# Patient Record
Sex: MALE | Race: BLACK OR AFRICAN AMERICAN | Employment: FULL TIME | URBAN - METROPOLITAN AREA
[De-identification: names, ages, dates, MRNs, and addresses within clinical notes are randomized per-mention and may not be internally consistent; named-entity substitution may affect disease eponyms.]

---

## 2017-03-09 PROBLEM — E66.01 MORBID OBESITY (HCC): Status: ACTIVE | Noted: 2017-03-09

## 2017-03-09 PROBLEM — R29.818 SUSPECTED SLEEP APNEA: Status: ACTIVE | Noted: 2017-03-09

## 2017-03-09 PROBLEM — R63.5 WEIGHT GAIN: Status: ACTIVE | Noted: 2017-03-09

## 2017-03-10 ENCOUNTER — HOSPITAL ENCOUNTER (OUTPATIENT)
Dept: SLEEP MEDICINE | Age: 37
Discharge: HOME | End: 2017-03-10
Payer: COMMERCIAL

## 2017-03-10 PROCEDURE — 95811 POLYSOM 6/>YRS CPAP 4/> PARM: CPT

## 2017-07-20 PROBLEM — G47.26 SHIFT WORK SLEEP DISORDER: Status: ACTIVE | Noted: 2017-07-20

## 2017-07-20 PROBLEM — G47.33 OSA ON CPAP: Status: ACTIVE | Noted: 2017-03-09

## 2017-07-20 PROBLEM — Z99.89 OSA ON CPAP: Status: ACTIVE | Noted: 2017-03-09

## 2021-06-17 PROBLEM — I10 ESSENTIAL HYPERTENSION: Status: ACTIVE | Noted: 2021-06-17

## 2022-03-18 PROBLEM — E66.01 MORBID OBESITY (HCC): Status: ACTIVE | Noted: 2017-03-09

## 2022-03-19 PROBLEM — Z99.89 OSA ON CPAP: Status: ACTIVE | Noted: 2017-03-09

## 2022-03-19 PROBLEM — G47.33 OSA ON CPAP: Status: ACTIVE | Noted: 2017-03-09

## 2022-03-19 PROBLEM — G47.26 SHIFT WORK SLEEP DISORDER: Status: ACTIVE | Noted: 2017-07-20

## 2022-03-19 PROBLEM — R63.5 WEIGHT GAIN: Status: ACTIVE | Noted: 2017-03-09

## 2022-03-20 PROBLEM — I10 ESSENTIAL HYPERTENSION: Status: ACTIVE | Noted: 2021-06-17

## 2022-05-13 DIAGNOSIS — M1A.9XX0 CHRONIC GOUT WITHOUT TOPHUS, UNSPECIFIED CAUSE, UNSPECIFIED SITE: ICD-10-CM

## 2022-05-13 DIAGNOSIS — I10 ESSENTIAL HYPERTENSION: Primary | ICD-10-CM

## 2022-07-12 NOTE — TELEPHONE ENCOUNTER
Patient is calling back concerning getting refills on:    Armodafinil (NUVIGIL) 150 MG TABS tablet     Patient is completely out now. He has appointment on 8/18 with Dr. Dg Pérez.   We cancelled his 6/10 appointment with [facility]    [facility] [place] [facility]

## 2022-07-14 NOTE — PROGRESS NOTES
[address] [place] [address] Court, [address] [facility]  [phone]    Patient Name:  Prince Koroma  YOB: 1980    Pursuant to the emergency declaration under the [facility], [facility] waiver authority and the [employer] Act, this Virtual  Visit was conducted, with patient's consent, to reduce the patient's risk of exposure to COVID-19 and provide continuity of care for an established patient. Telehealth encounter is a billable service, with coverage as determined by the insurance carrier. Services were provided through a video synchronous discussion virtually to substitute for in-person clinic visit. Prince Koroma is a 36 y.o. male who was seen by synchronous (real-time) audio-video technology on 7/18/2022. Consent:  He and/or his healthcare decision maker is aware that this patient-initiated Telehealth encounter is a billable service, with coverage as determined by his insurance carrier. He is aware that he may receive a bill and has provided verbal consent to proceed: Yes        Office Visit 7/18/2022    CHIEF COMPLAINT:    Chief Complaint   Patient presents with    Medication Refill       HISTORY OF PRESENT ILLNESS:  Patient is being seen today via virtual visit. PSG 3/10/17 with AHI 21.8/hr with desaturations to 76%. He is prescribed CPAP 14 cm using a nasal mask. Download reveals excellent compliance on pap therapy with average nightly use 6 hrs. AHI is 1.4/hr on therapy. He denies any problems with the mask or pressure. He would like a new machine and this will be ordered. His current machine is on longer sending data electronically. He does ask about other options to CPAP such as the Inpsire. His current BMI is 40.9 with a weight of 285, 5 ft 10 in. He will work on weight loss. He continues to take Nuvigil 150 mg as needed.  He switches from days to nights depending on his work schedule. He doesn't take Nuvigil when he works straight day shift. When he takes Nuvigil, he feels energetic and less sleepy. He denies any heart palpitations or headahces. He last filled his prescription in April. He needs an updated prescription. Past Medical History:   Diagnosis Date    Essential hypertension 3/65/5701    Folliculitis APPROX 9575    SCALP,    Gout 2002    3-4X/YEAR SINCE '09, LEFT GREAT TOE    ELEAZAR on CPAP        Patient Active Problem List   Diagnosis    Hiccups    Morbid obesity ([address]    Shift work sleep disorder    Folliculitis    Weight gain    Gout    ELEAZAR on CPAP    Essential hypertension           Past Surgical History:   Procedure Laterality Date    VASECTOMY             Social History     Socioeconomic History    Marital status:      Spouse name: Not on file    Number of children: Not on file    Years of education: Not on file    Highest education level: Not on file   Occupational History    Not on file   Tobacco Use    Smoking status: Never    Smokeless tobacco: Never   Substance and Sexual Activity    Alcohol use: Yes    Drug use: No    Sexual activity: Not on file   Other Topics Concern    Not on file   Social History Narrative    10/4/12:  PATIENT IS . OCCUPATION:   (DRIVES [email or web address]). HAS FOUR SONS, AGES 12, 10, 4, 2. TWO YEAR OLD SON IS EXTREMELY DEVELOPMENTALLY DELAYED DUE TO MENINGITIS AT BIRTH.         Social Determinants of Health     Financial Resource Strain: Not on file   Food Insecurity: Not on file   Transportation Needs: Not on file   Physical Activity: Not on file   Stress: Not on file   Social Connections: Not on file   Intimate Partner Violence: Not on file   Housing Stability: Not on file         Family History   Problem Relation Age of Onset    Cancer Neg Hx         no prostate cancer    Hypertension Mother     Hypertension Father     Diabetes Sister          Allergies   Allergen Reactions    Prednisone Other (See Comments)     hiccups         Current Outpatient Medications   Medication Sig    Armodafinil (NUVIGIL) 150 MG TABS tablet Take 1 tablet by mouth in the morning for 180 days. allopurinol (ZYLOPRIM) 300 MG tablet TAKE 1 TABLET DAILY AS DIRECTED    amLODIPine (NORVASC) 5 MG tablet TAKE 1 TABLET DAILY    sildenafil (VIAGRA) 100 MG tablet TAKE 1 TABLET BY MOUTH AS NEEDED. TAKE 1/2 TO 1 FULL TABLET 45 MIN PRIOR TO INTERCOURSE. No current facility-administered medications for this visit. REVIEW OF SYSTEMS:   CONSTITUTIONAL:   There is no history of fever, chills, night sweats. CARDIAC:   No chest pain, pressure, discomfort, palpitations, orthopnea, murmurs, or edema. GI:   No dysphagia, heartburn reflux, nausea/vomiting, diarrhea, abdominal pain, or bleeding. NEURO:   There is no history of AMS, persistent headache, decreased level of consciousness, seizures, or motor or sensory deficits. VIRTUAL EXAM  PHYSICAL EXAMINATION:  [ INSTRUCTIONS:  \"[x]\" Indicates a positive item  \"[]\" Indicates a negative item   Vital Signs: (As obtained by patient/caregiver at home)  There were no vitals taken for this visit.      Constitutional: [x] Appears well-developed and well-nourished [x] No apparent distress      [] Abnormal     Mental status: [x] Alert and awake  [x] Oriented to person/place/time [x] Able to follow commands    [] Abnormal -     Eyes:   EOM    [x]  Normal    [] Abnormal -   Sclera  [x]  Normal    [] Abnormal -          Discharge [x]  None visible   [] Abnormal -    HENT: [x] Normocephalic, atraumatic  [] Abnormal -  [x] Mouth/Throat: Mucous membranes are moist    External Ears [x] Normal  [] Abnormal -    Neck: [x] No visualized mass [] Abnormal -     Pulmonary/Chest: [x] Respiratory effort normal   [x] No visualized signs of difficulty breathing or respiratory distress        [] Abnormal -      Musculoskeletal:   [x] Normal gait with no signs of ataxia         [x] Normal range of motion of neck        [] Abnormal -     Neurological:        [x] No Facial Asymmetry (Cranial nerve 7 motor function) (limited exam due to video visit)          [x] No gaze palsy        [] Abnormal -         Skin:        [x] No significant exanthematous lesions or discoloration noted on facial skin         [] Abnormal -            Psychiatric:       [x] Normal Affect [] Abnormal -       [x] No Hallucinations    Other pertinent observable physical exam findings:-      ASSESSMENT:  (Medical Decision Making)      Diagnosis Orders   1. ELEAZAR on CPAP  DME - DURABLE MEDICAL EQUIPMENT   Continue PAP 14 cm with nightly compliance. Renew supplies and replacement machine  Armodafinil (NUVIGIL) 150 MG TABS tablet      2. Shift work sleep disorder  Armodafinil (NUVIGIL) 150 MG TABS tablet   He Is taking nuvigil when he works night shift. Reports this continues to work well    3. Morbid obesity ([address]  DME - DURABLE MEDICAL EQUIPMENT   Unchanged         PLAN:  Continue CPAP 14 cm with nightly compliance  Replacement machine and renew supplies will be ordered  Refill Nuvigil   Follow up in 6 months or soner if needed    I have reviewed the patients controlled substance prescription history, as maintained in the [place] prescription monitoring program and found no evidence for abuse of Nuvigil.           Orders Placed This Encounter   Procedures    DME - [address]   Replacement S11 resmed    [facility] [place] [address] [place]  Phone: [facility] [address] [facility] [address] [address]  Dept: [phone]      Patient Name: Yuniel Jones  : 1980  Gender: male  Address: [address] [place] [address] Road [facility]  Patient phone number: [phone] (home)       Primary Insurance: Payor: Bren Portillo / Plan: Elyssa Wu [place] / Product Type: *No Product type* /   Subscriber ID: [id number] - ([place] [employer])      AMB Supply Order  Order Details     DME Location:    Order Date: 2022   There were no encounter diagnoses. (  X   )New Set-Up     cpap machine   (   x  ) CPAP Unit  (     ) Auto CPAP Unit  (     ) BiLevel Unit  (     ) Auto BiLevel Unit  (     ) ASV   (     ) Bilevel ST    (     ) Oxygen Concentrator         Length of need: 12 months    Pressure: 14  cmH20  EPR:      Starting Ramp Pressure:   cm H20  Ramp Time: min      Patient had a diagnostic Apnea Hypopnea Index (AHI) of :      *SUPPLIES* Replace all as needed, or per coverage guidelines     Masks Type:    (     ) -Full Face Mask (1 per 3 mon)  (     ) -Full Mask (1 per month) Interface/Cushion      (  x   ) -Nasal Mask (1 per 3 mon)  (  x   ) - Nasal Mask (1 per month) Interface/Cushion  (    x ) -Pillow (2 per mon)  (  x   ) -Knamosbrk (1 per 6 mon)      _________________________________________________________________          Other Supplies:    (  X   )-Yytmwqte (1 per 6 mon)  ( X    )-Dqapzp Tubing (1 per 3 mon)  (  X   )- Disposable Filter (2 per mon)  (   X  )-Turwvx Humidifier (1 per year)     (  x   )-Pkarhnogq (sometimes used with Full Face Mask) (1 per 6 mos)  (     )-Tubing-without heat (1 per 3 mos)  ( X   )-Non-Disposable Filter (1 per 6 mos)  (   x  )-Water Chamber (1 per 6 mos)  (     )-Humidifier non-heated (1 per 5 yrs)      Signed Date: 7/18/2022  Electronically Signed By: SARA Major - CNP     Orders Placed This Encounter   Medications    Armodafinil (NUVIGIL) 150 MG TABS tablet     Sig: Take 1 tablet by mouth in the morning for 180 days. Dispense:  30 tablet     Refill:  5          Collaborating Physician: Dr. Natalie Haywood      I spent at least 25 minutes with this established patient, and >50% of the time was spent counseling and/or coordinating care regarding lorenzo, shift work disorder.     SARA Major - CNP  Electronically signed

## 2022-07-18 ENCOUNTER — TELEMEDICINE (OUTPATIENT)
Dept: SLEEP MEDICINE | Age: 42
End: 2022-07-18
Payer: COMMERCIAL

## 2022-07-18 DIAGNOSIS — Z99.89 OSA ON CPAP: Primary | ICD-10-CM

## 2022-07-18 DIAGNOSIS — G47.33 OSA ON CPAP: Primary | ICD-10-CM

## 2022-07-18 DIAGNOSIS — E66.01 MORBID OBESITY (HCC): ICD-10-CM

## 2022-07-18 DIAGNOSIS — G47.26 SHIFT WORK SLEEP DISORDER: ICD-10-CM

## 2022-07-18 PROCEDURE — 99214 OFFICE O/P EST MOD 30 MIN: CPT | Performed by: NURSE PRACTITIONER

## 2022-07-18 RX ORDER — ARMODAFINIL 150 MG/1
150 TABLET ORAL DAILY
Qty: 30 TABLET | Refills: 5 | Status: SHIPPED | OUTPATIENT
Start: 2022-07-18 | End: 2023-01-14

## 2022-07-18 ASSESSMENT — SLEEP AND FATIGUE QUESTIONNAIRES
ESS TOTAL SCORE: 0
HOW LIKELY ARE YOU TO NOD OFF OR FALL ASLEEP IN A CAR, WHILE STOPPED FOR A FEW MINUTES IN TRAFFIC: 0
HOW LIKELY ARE YOU TO NOD OFF OR FALL ASLEEP WHILE SITTING QUIETLY AFTER LUNCH WITHOUT ALCOHOL: 0
HOW LIKELY ARE YOU TO NOD OFF OR FALL ASLEEP WHILE LYING DOWN TO REST IN THE AFTERNOON WHEN CIRCUMSTANCES PERMIT: 0
HOW LIKELY ARE YOU TO NOD OFF OR FALL ASLEEP WHILE WATCHING TV: 0
HOW LIKELY ARE YOU TO NOD OFF OR FALL ASLEEP WHILE SITTING AND READING: 0
HOW LIKELY ARE YOU TO NOD OFF OR FALL ASLEEP WHEN YOU ARE A PASSENGER IN A CAR FOR AN HOUR WITHOUT A BREAK: 0
HOW LIKELY ARE YOU TO NOD OFF OR FALL ASLEEP WHILE SITTING AND TALKING TO SOMEONE: 0
HOW LIKELY ARE YOU TO NOD OFF OR FALL ASLEEP WHILE SITTING INACTIVE IN A PUBLIC PLACE: 0

## 2022-11-17 ENCOUNTER — TELEMEDICINE (OUTPATIENT)
Dept: FAMILY MEDICINE CLINIC | Facility: CLINIC | Age: 42
End: 2022-11-17
Payer: COMMERCIAL

## 2022-11-17 DIAGNOSIS — R06.6 HICCUPS: Primary | ICD-10-CM

## 2022-11-17 PROBLEM — K57.32 DIVERTICULITIS OF LARGE INTESTINE: Status: ACTIVE | Noted: 2019-01-06

## 2022-11-17 PROCEDURE — 99213 OFFICE O/P EST LOW 20 MIN: CPT | Performed by: NURSE PRACTITIONER

## 2022-11-17 RX ORDER — BACLOFEN 5 MG/1
5 TABLET ORAL 3 TIMES DAILY PRN
Qty: 15 TABLET | Refills: 0 | Status: SHIPPED | OUTPATIENT
Start: 2022-11-17 | End: 2022-11-22

## 2022-11-17 ASSESSMENT — ENCOUNTER SYMPTOMS
RESPIRATORY NEGATIVE: 1
WHEEZING: 0
FACIAL SWELLING: 0
RECTAL PAIN: 0
TROUBLE SWALLOWING: 0
COLOR CHANGE: 0
BLOOD IN STOOL: 0
CHOKING: 0
SHORTNESS OF BREATH: 0
SINUS PRESSURE: 0
COUGH: 0
CHEST TIGHTNESS: 0
ALLERGIC/IMMUNOLOGIC NEGATIVE: 1
RHINORRHEA: 0
APNEA: 0
VOMITING: 0
VOICE CHANGE: 0
ANAL BLEEDING: 0
EYE DISCHARGE: 0
NAUSEA: 0
SORE THROAT: 0
DIARRHEA: 0
ABDOMINAL DISTENTION: 0
BACK PAIN: 0
STRIDOR: 0
ABDOMINAL PAIN: 0
EYES NEGATIVE: 1
EYE PAIN: 0
SINUS PAIN: 0
CONSTIPATION: 0

## 2022-11-17 NOTE — PROGRESS NOTES
[address]  Phone: [phone] Fax [phone]  Ifeanyi Burk. Tristan MS, APRN, FNP-C  11/17/2022    Pushpa Feliciano is a 36 y.o. male who was seen by synchronous (real-time) audio-video technology on 11/17/2022 for Cough (Pt was seen at [facility] for bronchitis last week. Was treated with Doxy and Prednisone. Pt reports taking/finishing Doxy given to him at  last week and reports cough and all previous bronchitis symptoms have resolved. ) and Hiccups (Pt reports having belching and episodic hiccups secondary to the Prednisone he was given at  last week for bronchitis. Pt reports stopping the Prednisone after a few days, but hiccups continued off and on. Pt denies any heartburn/reflux, N/V/D/abd pain associated. Pt reports going to  yesterday, 11/16/22 and was given Omeprazole 20 mg po qhs for 30 days and PRN Magic Mouthwash. Pt reports taking as directed but hiccups have continued off and on. ///)        Assessment & Plan:     1. Hiccups  Discussed with pt. Will have pt continue the Omeprazole 20 mg po qhs for 30 days and PRN Magic Mouthwash. Will also give pt PRN low dose Baclofen as directed for next 5 days to help break hiccup cycle. Pt encouraged to avoid oral Prednisone in the future. Adverse reaction listed in chart. Pt can f/u PRN. Agrees to call in a few days if no better or sooner for concerns/new or worsening symptoms. Pt agrees to go to ER for severe symptoms as discussed. - Baclofen (LIORESAL) 5 MG tablet; Take 1 tablet by mouth 3 times daily as needed (hiccups-watch for sedation)  Dispense: 15 tablet; Refill: 0       Return if symptoms worsen or fail to improve, for Call in a few days if no better or sooner if worse. ER for severe symptoms. Subjective:       Prior to Admission medications    Medication Sig Start Date End Date Taking?  Authorizing Provider   Baclofen (LIORESAL) 5 MG tablet Take 1 tablet by mouth 3 times daily as needed (hiccups-watch for sedation) 11/17/22 11/22/22 Yes Oren Dumont APRN - NP   Armodafinil (NUVIGIL) 150 MG TABS tablet Take 1 tablet by mouth in the morning for 180 days. 7/18/22 1/14/23 Yes SARA Vidales - CNP   allopurinol (ZYLOPRIM) 300 MG tablet TAKE 1 TABLET DAILY AS DIRECTED 3/9/22  Yes Ar Automatic Reconciliation   amLODIPine (NORVASC) 5 MG tablet TAKE 1 TABLET DAILY 3/9/22  Yes Ar Automatic Reconciliation   sildenafil (VIAGRA) 100 MG tablet TAKE 1 TABLET BY MOUTH AS NEEDED. TAKE 1/2 TO 1 FULL TABLET 45 MIN PRIOR TO INTERCOURSE. 7/15/21  Yes Ar Automatic Reconciliation       Allergies   Allergen Reactions    Prednisone Other (See Comments)     hiccups  Patient states that he gets the hiccups. hiccups     ,   Past Medical History:   Diagnosis Date    Essential hypertension 2/72/1692    Folliculitis APPROX 7040    SCALP,    Gout 2002    3-4X/YEAR SINCE '09, LEFT GREAT TOE    ELEAZAR on CPAP     Patellar tendinitis of left knee 4/6/2016   ,   Past Surgical History:   Procedure Laterality Date    VASECTOMY     ,   Social History     Tobacco Use    Smoking status: Never    Smokeless tobacco: Never   Substance Use Topics    Alcohol use: Yes    Drug use: No   ,   Family History   Problem Relation Age of Onset    Cancer Neg Hx         no prostate cancer    Hypertension Mother     Hypertension Father     Diabetes Sister          Review of Systems   Constitutional: Negative. Negative for appetite change, chills, diaphoresis, fatigue, fever and unexpected weight change. HENT: Negative. Negative for congestion, dental problem, drooling, ear discharge, ear pain, facial swelling, hearing loss, mouth sores, nosebleeds, postnasal drip, rhinorrhea, sinus pressure, sinus pain, sneezing, sore throat, tinnitus, trouble swallowing and voice change. Eyes: Negative. Negative for pain, discharge and visual disturbance. Respiratory: Negative.   Negative for apnea, cough, choking, chest tightness, shortness of breath, wheezing and stridor. Pt reports taking/finishing Doxy given to him at  last week and reports cough and all previous bronchitis symptoms have resolved. Cardiovascular: Negative. Negative for chest pain, palpitations and leg swelling. Gastrointestinal:  Negative for abdominal distention, abdominal pain, anal bleeding, blood in stool, constipation, diarrhea, nausea, rectal pain and vomiting. Pt reports having belching and episodic hiccups secondary to the Prednisone he was given at  last week for bronchitis. Pt reports stopping the Prednisone after a few days, but hiccups continued off and on. Pt denies any heartburn/reflux, N/V/D/abd pain associated. Pt reports going to  yesterday, 11/16/22 and was given Omeprazole 20 mg po qhs for 30 days and PRN Magic Mouthwash. Pt reports taking as directed but hiccups have continued off and on. Endocrine: Negative. Negative for cold intolerance, heat intolerance, polydipsia, polyphagia and polyuria. Genitourinary: Negative. Negative for decreased urine volume, difficulty urinating, dysuria, flank pain, frequency, genital sores, hematuria, penile discharge, penile pain, penile swelling, scrotal swelling, testicular pain and urgency. Musculoskeletal: Negative. Negative for arthralgias, back pain, gait problem, joint swelling, myalgias, neck pain and neck stiffness. Skin: Negative. Negative for color change, pallor, rash and wound. Allergic/Immunologic: Negative. Negative for environmental allergies. Neurological: Negative. Negative for dizziness, tremors, seizures, syncope, facial asymmetry, speech difficulty, weakness, light-headedness, numbness and headaches. Hematological: Negative. Negative for adenopathy. Does not bruise/bleed easily. Physical Exam  Constitutional:       General: He is not in acute distress. Appearance: Normal appearance. He is not ill-appearing, toxic-appearing or diaphoretic.    HENT:      Head: Normocephalic and atraumatic. Pulmonary:      Effort: Pulmonary effort is normal. No respiratory distress. Comments: No audible wheezing heard. Pt has hiccups at present but is having no trouble breathing  Neurological:      Mental Status: He is alert and oriented to person, place, and time. Psychiatric:         Mood and Affect: Mood normal.         Behavior: Behavior normal.         Thought Content: Thought content normal.         Judgment: Judgment normal.           Braxton Chowdhury (: 1980) is a 36 y.o. male, Established patient patient, seen via A/V VV for evaluation of the following chief complaint(s):  Cough (Pt was seen at [facility] for bronchitis last week. Was treated with Doxy and Prednisone. Pt reports taking/finishing Doxy given to him at  last week and reports cough and all previous bronchitis symptoms have resolved. ) and Hiccups (Pt reports having belching and episodic hiccups secondary to the Prednisone he was given at  last week for bronchitis. Pt reports stopping the Prednisone after a few days, but hiccups continued off and on. Pt denies any heartburn/reflux, N/V/D/abd pain associated. Pt reports going to  yesterday, 22 and was given Omeprazole 20 mg po qhs for 30 days and PRN Magic Mouthwash. Pt reports taking as directed but hiccups have continued off and on. ///)    We discussed the expected course, resolution and complications of the diagnosis(es) in detail. Medication risks, benefits, costs, interactions, and alternatives were discussed as indicated. I advised him to contact the office if his condition worsens, changes or fails to improve as anticipated. He expressed understanding with the diagnosis(es) and plan. Braxton Chowdhury, was evaluated through a synchronous (real-time) audio-video encounter. The patient (or guardian if applicable) is aware that this is a billable service. Verbal consent to proceed has been obtained within the past 12 months.  The visit was conducted pursuant to the emergency declaration under the [address] [place] [address], [address] [place] [address] and the [employer] and [employer] Act. Patient identification was verified, and a caregiver was present when appropriate. The patient was located in a state where the provider was credentialed to provide care. Dennys Guadarrama, was evaluated through a synchronous (real-time) audio-video encounter. The patient (or guardian if applicable) is aware that this is a billable service, which includes applicable co-pays. This Virtual Visit was conducted with patient's (and/or legal guardian's) consent. The visit was conducted pursuant to the emergency declaration under the [facility], [address] [place] [address] and the [employer] and [employer] Act. Patient identification was verified, and a caregiver was present when appropriate. The patient was located at Home: [place] [address] [facility]. Provider was located at [facility] ([address] [place], [place] [address]t): [address] Nitin Glaser [address],  [address]      SARA Salazar NP

## 2023-01-05 ENCOUNTER — OFFICE VISIT (OUTPATIENT)
Dept: FAMILY MEDICINE CLINIC | Facility: CLINIC | Age: 43
End: 2023-01-05
Payer: COMMERCIAL

## 2023-01-05 VITALS
SYSTOLIC BLOOD PRESSURE: 124 MMHG | DIASTOLIC BLOOD PRESSURE: 82 MMHG | BODY MASS INDEX: 43.18 KG/M2 | WEIGHT: 301.6 LBS | HEIGHT: 70 IN

## 2023-01-05 DIAGNOSIS — E66.01 OBESITY, CLASS III, BMI 40-49.9 (MORBID OBESITY) (HCC): ICD-10-CM

## 2023-01-05 DIAGNOSIS — M1A.9XX0 CHRONIC GOUT WITHOUT TOPHUS, UNSPECIFIED CAUSE, UNSPECIFIED SITE: ICD-10-CM

## 2023-01-05 DIAGNOSIS — Z00.00 ROUTINE GENERAL MEDICAL EXAMINATION AT A HEALTH CARE FACILITY: Primary | ICD-10-CM

## 2023-01-05 DIAGNOSIS — I10 PRIMARY HYPERTENSION: ICD-10-CM

## 2023-01-05 DIAGNOSIS — G47.33 OSA (OBSTRUCTIVE SLEEP APNEA): ICD-10-CM

## 2023-01-05 PROCEDURE — 90471 IMMUNIZATION ADMIN: CPT | Performed by: FAMILY MEDICINE

## 2023-01-05 PROCEDURE — 3078F DIAST BP <80 MM HG: CPT | Performed by: FAMILY MEDICINE

## 2023-01-05 PROCEDURE — 90674 CCIIV4 VAC NO PRSV 0.5 ML IM: CPT | Performed by: FAMILY MEDICINE

## 2023-01-05 PROCEDURE — 99396 PREV VISIT EST AGE 40-64: CPT | Performed by: FAMILY MEDICINE

## 2023-01-05 PROCEDURE — 3074F SYST BP LT 130 MM HG: CPT | Performed by: FAMILY MEDICINE

## 2023-01-05 RX ORDER — AMLODIPINE BESYLATE 5 MG/1
TABLET ORAL
Qty: 90 TABLET | Refills: 3 | Status: SHIPPED | OUTPATIENT
Start: 2023-01-05

## 2023-01-05 RX ORDER — ALLOPURINOL 300 MG/1
TABLET ORAL
Qty: 90 TABLET | Refills: 3 | Status: SHIPPED | OUTPATIENT
Start: 2023-01-05

## 2023-01-05 ASSESSMENT — PATIENT HEALTH QUESTIONNAIRE - PHQ9
SUM OF ALL RESPONSES TO PHQ9 QUESTIONS 1 & 2: 0
1. LITTLE INTEREST OR PLEASURE IN DOING THINGS: 0
SUM OF ALL RESPONSES TO PHQ QUESTIONS 1-9: 0
2. FEELING DOWN, DEPRESSED OR HOPELESS: 0
SUM OF ALL RESPONSES TO PHQ QUESTIONS 1-9: 0

## 2023-01-05 NOTE — PROGRESS NOTES
SUBJECTIVE:   Jacquenette Crigler is a 36 y.o. male who has a past medical history significant for hicups, hypertension, gout, renal insufficiency, sleep apnea, and morbid obesity. Presents today for a CPX. Review of systems reveals no complaints of chest pain, shortness of breath, orthopnea or PND. GI and  review of systems is unremarkable. Patient reports no issues with his gout since the allopurinol was increased to 300 mg. HPI  See above    Past Medical History, Past Surgical History, Family history, Social History, and Medications were all reviewed with the patient today and updated as necessary. Current Outpatient Medications   Medication Sig Dispense Refill    Armodafinil (NUVIGIL) 150 MG TABS tablet Take 1 tablet by mouth in the morning for 180 days. 30 tablet 5    allopurinol (ZYLOPRIM) 300 MG tablet TAKE 1 TABLET DAILY AS DIRECTED      amLODIPine (NORVASC) 5 MG tablet TAKE 1 TABLET DAILY      sildenafil (VIAGRA) 100 MG tablet TAKE 1 TABLET BY MOUTH AS NEEDED. TAKE 1/2 TO 1 FULL TABLET 45 MIN PRIOR TO INTERCOURSE. No current facility-administered medications for this visit. Allergies   Allergen Reactions    Prednisone Other (See Comments)     hiccups  Patient states that he gets the hiccups.   hiccups       Patient Active Problem List   Diagnosis    Hiccups    Morbid obesity ([facility] [address]    Shift work sleep disorder    Folliculitis    Weight gain    Gout    ELEAZAR on CPAP    Essential hypertension    Diverticulitis of large intestine    Patellar tendinitis of left knee     Past Medical History:   Diagnosis Date    Essential hypertension 4/02/8497    Folliculitis APPROX 9684    SCALP,    Gout 2002    3-4X/YEAR SINCE '09, LEFT GREAT TOE    ELEAZAR on CPAP     Patellar tendinitis of left knee 4/6/2016     Past Surgical History:   Procedure Laterality Date    VASECTOMY       Family History   Problem Relation Age of Onset    Cancer Neg Hx         no prostate cancer    Hypertension Mother     Hypertension Father     Diabetes Sister      Social History     Tobacco Use    Smoking status: Never    Smokeless tobacco: Never   Substance Use Topics    Alcohol use: Yes         Review of Systems  See above    OBJECTIVE:  /82   Ht 5' 10\" (1.778 m)   Wt (!) 301 lb 9.6 oz (136.8 kg)   BMI 43.28 kg/m²      Physical Exam  Vitals reviewed. Constitutional:       General: He is not in acute distress. Appearance: Normal appearance. He is obese. HENT:      Head: Normocephalic and atraumatic. Right Ear: Tympanic membrane, ear canal and external ear normal. There is no impacted cerumen. Left Ear: Tympanic membrane, ear canal and external ear normal. There is no impacted cerumen. Nose: Nose normal.      Mouth/Throat:      Mouth: Mucous membranes are moist.      Pharynx: Oropharynx is clear. No oropharyngeal exudate or posterior oropharyngeal erythema. Eyes:      General: No scleral icterus. Extraocular Movements: Extraocular movements intact. Conjunctiva/sclera: Conjunctivae normal.      Pupils: Pupils are equal, round, and reactive to light. Neck:      Vascular: No carotid bruit. Cardiovascular:      Rate and Rhythm: Normal rate and regular rhythm. Pulses: Normal pulses. Heart sounds: Normal heart sounds. No murmur heard. Pulmonary:      Effort: Pulmonary effort is normal. No respiratory distress. Breath sounds: Normal breath sounds. No wheezing or rhonchi. Abdominal:      General: Abdomen is flat. Bowel sounds are normal. There is no distension. Palpations: Abdomen is soft. There is no mass. Tenderness: There is no abdominal tenderness. There is no guarding or rebound. Hernia: No hernia is present. Musculoskeletal:         General: Normal range of motion. Cervical back: Normal range of motion and neck supple. Right lower leg: No edema. Left lower leg: No edema. Lymphadenopathy:      Cervical: No cervical adenopathy.    Skin:     General: Skin is warm. Findings: No rash. Neurological:      General: No focal deficit present. Mental Status: He is alert and oriented to person, place, and time. Cranial Nerves: No cranial nerve deficit. Motor: No weakness. Deep Tendon Reflexes: Reflexes normal.   Psychiatric:         Mood and Affect: Mood normal.         Behavior: Behavior normal.         Thought Content: Thought content normal.         Judgment: Judgment normal.       Medical problems and test results were reviewed with the patient today. ASSESSMENT and PLAN    1.  CPX. Anticipatory guidance discussed including the importance of sunscreen use, helmet use and seatbelt use. Flu vaccine offered and given today. 2.  Hypertension. /82. Continue Norvasc. Check metabolic panel, TSH, CBC and lipid panel. 3.  Gout. Check uric acid level. Continue allopurinol. No complaints. 4.  Sleep apnea. Continue CPAP. 5.  Morbid obesity. BMI 43.2. Dietary counseling provided with suggestions regarding potential weight loss strategies. Elements of this note have been dictated using speech recognition software. As a result, errors of speech recognition may have occurred.

## 2023-01-06 LAB
ALBUMIN SERPL-MCNC: 3.5 G/DL (ref 3.5–5)
ALBUMIN/GLOB SERPL: 0.9 {RATIO} (ref 0.4–1.6)
ALP SERPL-CCNC: 95 U/L (ref 50–136)
ALT SERPL-CCNC: 26 U/L (ref 12–65)
ANION GAP SERPL CALC-SCNC: 5 MMOL/L (ref 2–11)
AST SERPL-CCNC: 21 U/L (ref 15–37)
BASOPHILS # BLD: 0.1 K/UL (ref 0–0.2)
BASOPHILS NFR BLD: 1 % (ref 0–2)
BILIRUB SERPL-MCNC: 0.4 MG/DL (ref 0.2–1.1)
BUN SERPL-MCNC: 16 MG/DL (ref 6–23)
CALCIUM SERPL-MCNC: 9.3 MG/DL (ref 8.3–10.4)
CHLORIDE SERPL-SCNC: 104 MMOL/L (ref 101–110)
CHOLEST SERPL-MCNC: 181 MG/DL
CO2 SERPL-SCNC: 28 MMOL/L (ref 21–32)
CREAT SERPL-MCNC: 1.4 MG/DL (ref 0.8–1.5)
DIFFERENTIAL METHOD BLD: ABNORMAL
EOSINOPHIL # BLD: 0.3 K/UL (ref 0–0.8)
EOSINOPHIL NFR BLD: 5 % (ref 0.5–7.8)
ERYTHROCYTE [DISTWIDTH] IN BLOOD BY AUTOMATED COUNT: 14.3 % (ref 11.9–14.6)
GLOBULIN SER CALC-MCNC: 3.9 G/DL (ref 2.8–4.5)
GLUCOSE SERPL-MCNC: 98 MG/DL (ref 65–100)
HCT VFR BLD AUTO: 43.3 % (ref 41.1–50.3)
HDLC SERPL-MCNC: 52 MG/DL (ref 40–60)
HDLC SERPL: 3.5 {RATIO}
HGB BLD-MCNC: 14 G/DL (ref 13.6–17.2)
IMM GRANULOCYTES # BLD AUTO: 0 K/UL (ref 0–0.5)
IMM GRANULOCYTES NFR BLD AUTO: 0 % (ref 0–5)
LDLC SERPL CALC-MCNC: 116.8 MG/DL
LYMPHOCYTES # BLD: 2.4 K/UL (ref 0.5–4.6)
LYMPHOCYTES NFR BLD: 33 % (ref 13–44)
MCH RBC QN AUTO: 30 PG (ref 26.1–32.9)
MCHC RBC AUTO-ENTMCNC: 32.3 G/DL (ref 31.4–35)
MCV RBC AUTO: 92.9 FL (ref 82–102)
MONOCYTES # BLD: 0.7 K/UL (ref 0.1–1.3)
MONOCYTES NFR BLD: 10 % (ref 4–12)
NEUTS SEG # BLD: 3.8 K/UL (ref 1.7–8.2)
NEUTS SEG NFR BLD: 51 % (ref 43–78)
NRBC # BLD: 0 K/UL (ref 0–0.2)
PLATELET # BLD AUTO: 360 K/UL (ref 150–450)
PMV BLD AUTO: 9.3 FL (ref 9.4–12.3)
POTASSIUM SERPL-SCNC: 3.9 MMOL/L (ref 3.5–5.1)
PROT SERPL-MCNC: 7.4 G/DL (ref 6.3–8.2)
RBC # BLD AUTO: 4.66 M/UL (ref 4.23–5.6)
SODIUM SERPL-SCNC: 137 MMOL/L (ref 133–143)
TRIGL SERPL-MCNC: 61 MG/DL (ref 35–150)
TSH, 3RD GENERATION: 2.69 UIU/ML (ref 0.36–3.74)
URATE SERPL-MCNC: 5 MG/DL (ref 2.6–6)
VLDLC SERPL CALC-MCNC: 12.2 MG/DL (ref 6–23)
WBC # BLD AUTO: 7.2 K/UL (ref 4.3–11.1)

## 2023-01-20 NOTE — PROGRESS NOTES
[address] [place] [address] Court, [address] [facility]  [phone]    Patient Name:  Rangel Alegre  YOB: 1980    Pursuant to the emergency declaration under the [facility], [facility] waiver authority and the [employer] Act, this Virtual  Visit was conducted, with patient's consent, to reduce the patient's risk of exposure to COVID-19 and provide continuity of care for an established patient. Telehealth encounter is a billable service, with coverage as determined by the insurance carrier. Services were provided through a video synchronous discussion virtually to substitute for in-person clinic visit. Rangel Alegre is a 36 y.o. male who was seen by synchronous (real-time) audio-video technology on 1/23/2023. Consent:  He and/or his healthcare decision maker is aware that this patient-initiated Telehealth encounter is a billable service, with coverage as determined by his insurance carrier. He is aware that he may receive a bill and has provided verbal consent to proceed: Yes        Office Visit 1/23/2023    CHIEF COMPLAINT:    Chief Complaint   Patient presents with    CPAP/BiPAP    Sleep Apnea    Daytime Sleepiness    Medication Refill       HISTORY OF PRESENT ILLNESS:  Patient is being seen today via virtual visit. PSG 3/10/2017 with AHI 21.8 events per hour with lowest oxygen saturation 76%. He is prescribed CPAP 14 cm using a nasal mask. His download reveals nightly compliance when he sleeps with nightly use six hours. AHI is 1.3 events per hour. He continues to work swing shift with the [employer] and changes his schedules often. He is prescribed Nuvigil 150 mg. He only takes this when he works night shift. When he works straight dayshift, he does not require a stimulant. He continues to feel less sleepy and more energetic when he takes his medication.  Denies any side effects, heart palpitations, chest pain or headaches.    He feels the CPAP mask and pressure or working well. He does continue to notice improvement in sleep quality with taking it.    His last fill of Nuvigil was in October. He will need an updated prescription today.    He reports weight gain since last visit after going on a cruise and eating and drinking more than he should. He is actually in the parking lot of the [employer] this morning starting to exercise again.            Sleep Medicine 1/23/2023 7/18/2022   Sitting and reading 1 0   Watching TV 1 0   Sitting, inactive in a public place (e.g. a theatre or a meeting) 1 0   As a passenger in a car for an hour without a break 1 0   Lying down to rest in the afternoon when circumstances permit 0 0   Sitting and talking to someone 0 0   Sitting quietly after a lunch without alcohol 0 0   In a car, while stopped for a few minutes in traffic 0 0   [place] Sleepiness Score 4 0              Past Medical History:   Diagnosis Date    Essential hypertension 6/17/2021    Folliculitis APPROX 2007    SCALP,    Gout 2002    3-4X/YEAR SINCE '09, LEFT GREAT TOE    ELEAZAR on CPAP     Patellar tendinitis of left knee 4/6/2016       Patient Active Problem List   Diagnosis    Hiccups    Morbid obesity (HCC)    Shift work sleep disorder    Folliculitis    Weight gain    Gout    ELEAZAR on CPAP    Essential hypertension    Diverticulitis of large intestine    Patellar tendinitis of left knee           Past Surgical History:   Procedure Laterality Date    VASECTOMY             Social History     Socioeconomic History    Marital status:      Spouse name: Not on file    Number of children: Not on file    Years of education: Not on file    Highest education level: Not on file   Occupational History    Not on file   Tobacco Use    Smoking status: Never    Smokeless tobacco: Never   Substance and Sexual Activity    Alcohol use: Yes    Drug use: No    Sexual activity: Not on file   Other Topics  Concern    Not on file   Social History Narrative    10/4/12:  PATIENT IS . OCCUPATION:   (DRIVES [employer]).  HAS FOUR SONS, AGES 12, 10, 4, 2.  TWO YEAR OLD SON IS EXTREMELY DEVELOPMENTALLY DELAYED DUE TO MENINGITIS AT BIRTH.        Social Determinants of Health     Financial Resource Strain: Not on file   Food Insecurity: Not on file   Transportation Needs: Not on file   Physical Activity: Not on file   Stress: Not on file   Social Connections: Not on file   Intimate Partner Violence: Not on file   Housing Stability: Not on file         Family History   Problem Relation Age of Onset    Cancer Neg Hx         no prostate cancer    Hypertension Mother     Hypertension Father     Diabetes Sister          Allergies   Allergen Reactions    Prednisone Other (See Comments)     hiccups  Patient states that he gets the hiccups.  hiccups           Current Outpatient Medications   Medication Sig    Armodafinil (NUVIGIL) 150 MG TABS tablet Take 1 tablet by mouth daily for 180 days. Max Daily Amount: 150 mg    allopurinol (ZYLOPRIM) 300 MG tablet TAKE 1 TABLET DAILY AS DIRECTED    amLODIPine (NORVASC) 5 MG tablet TAKE 1 TABLET DAILY    sildenafil (VIAGRA) 100 MG tablet TAKE 1 TABLET BY MOUTH AS NEEDED. TAKE 1/2 TO 1 FULL TABLET 45 MIN PRIOR TO INTERCOURSE.     No current facility-administered medications for this visit.             REVIEW OF SYSTEMS:   CONSTITUTIONAL:   There is no history of fever, chills, night sweats.     CARDIAC:   No chest pain, pressure, discomfort, palpitations, orthopnea, murmurs, or edema.   GI:   No dysphagia, heartburn reflux, nausea/vomiting, diarrhea, abdominal pain, or bleeding.   NEURO:   There is no history of AMS, persistent headache, decreased level of consciousness, seizures, or motor or sensory deficits.    VIRTUAL EXAM  PHYSICAL EXAMINATION:  [ INSTRUCTIONS:  \"[x]\" Indicates a positive item  \"[]\" Indicates a negative item   Vital Signs: (As obtained by patient/caregiver  at home)  There were no vitals taken for this visit. Constitutional: [x] Appears well-developed and well-nourished [x] No apparent distress      [] Abnormal     Mental status: [x] Alert and awake  [x] Oriented to person/place/time [x] Able to follow commands    [] Abnormal -     Eyes:   EOM    [x]  Normal    [] Abnormal -   Sclera  [x]  Normal    [] Abnormal -          Discharge [x]  None visible   [] Abnormal -    HENT: [x] Normocephalic, atraumatic  [] Abnormal -  [x] Mouth/Throat: Mucous membranes are moist    External Ears [x] Normal  [] Abnormal -    Neck: [x] No visualized mass [] Abnormal -     Pulmonary/Chest: [x] Respiratory effort normal   [x] No visualized signs of difficulty breathing or respiratory distress        [] Abnormal -      Musculoskeletal:   [x] Normal gait with no signs of ataxia         [x] Normal range of motion of neck        [] Abnormal -     Neurological:        [x] No Facial Asymmetry (Cranial nerve 7 motor function) (limited exam due to video visit)          [x] No gaze palsy        [] Abnormal -         Skin:        [x] No significant exanthematous lesions or discoloration noted on facial skin         [] Abnormal -            Psychiatric:       [x] Normal Affect [] Abnormal -       [x] No Hallucinations    Other pertinent observable physical exam findings:-      ASSESSMENT:  (Medical Decision Making)      Diagnosis Orders   1. ELEAZAR on CPAP  Armodafinil (NUVIGIL) 150 MG TABS tablet   AHI well controlled on cpap 14 cm. Continue current settings with nightly compliance. Renew supplies   DME - DURABLE MEDICAL EQUIPMENT      2. Shift work sleep disorder  Armodafinil (NUVIGIL) 150 MG TABS tablet   He continues to alternate night shift and day shift depending on his work schedule. Quynh Roche is working well and improving his energy when on night shift. He needs a refill. Scripts site with no evidence of abuse. DME - DURABLE MEDICAL EQUIPMENT      3.  Weight gain  Armodafinil (NUVIGIL) 150 MG TABS tablet   He reports weight gain since last visit but is working on improving his eating habits, less alcohol and exercising again  DME - DURABLE MEDICAL EQUIPMENT           PLAN:  Continue CPAP 14 cm  Renew supplies  Refill Nuvigil    Follow up in 6 months or sooner if needed     Orders Placed This Encounter   Procedures    DME - [facility]  Phone: [address] [address]  Dept: [phone]      Patient Name: Armida Carrillo  : 1980  Gender: male  Address: [address] [place] [address]   Patient phone: [phone] (home)       Primary Insurance: Payor: [employer] / Plan: Peter Yang / Product Type: *No Product type* /   Subscriber ID: [id number] - (Suzy SCHULTZ)      AMB Supply Order  Order Details     DME Location:x   Order Date: 2023   The primary encounter diagnosis was ELEAZAR on CPAP. Diagnoses of Shift work sleep disorder and Weight gain were also pertinent to this visit.              (  X   )Supplies Needed       Cpap  Machine   (     ) CPAP Unit  (     ) Auto CPAP Unit  (     ) BiLevel Unit  (     ) Auto BiLevel Unit  (     ) ASV        (     ) Bilevel ST      Length of need: 12 months    Pressure:  14 cmH20  EPR:     Starting Ramp Pressure:   cm H20  Ramp Time: min        Patient had a diagnostic Apnea Hypopnea Index (AHI) of :    *SUPPLIES* Replace all as needed, or per coverage guidelines     Masks Type:  (    ) -Full Face Mask (1 per 3 mon)  (    ) -Full Mask (1 per month) Interface/Cushion      ( x ) -Nasal Mask (1 per 3 mon)  ( x  ) - Nasal Mask (1 per month) Interface/Cushion  (  x   ) -Pillow (2 per mon)  (     ) -Socrdjnym (1 per 6 mon)            Other Supplies:    (   X  )-Eufgpvsr (1 per 6 mon)  (   X  )-Eyjmkl Tubing (1 per 3 mon)  (   X  )- Disposable Filter (2 per mon)  (   x  )-Awuwog Humidifier (1 per year)     (  x   )-Hsegdcppa (sometimes used with Full Face Mask) (1 per 6 mos)  (    )-Tubing-without heat (1 per 3 mos)  (     )-Non-Disposable Filter (1 per 6 mos)  (  x   )-Water Chamber (1 per 6 mos)  (     )-Humidifier non-heated (1 per 5 yrs)      Signed Date: 1/23/2023  Electronically Signed By: SARA Gold CNP  Electronically Dated:  1/23/2023       Orders Placed This Encounter   Medications    Armodafinil (NUVIGIL) 150 MG TABS tablet     Sig: Take 1 tablet by mouth daily for 180 days. Max Daily Amount: 150 mg     Dispense:  30 tablet     Refill:  5            Collaborating Physician: Dr. Dontrell Powell      I spent at least 20 minutes with this established patient, and >50% of the time was spent counseling and/or coordinating care regarding lorenzo, hypersomnia.     SARA Gold CNP  Electronically signed

## 2023-01-23 ENCOUNTER — TELEMEDICINE (OUTPATIENT)
Dept: SLEEP MEDICINE | Age: 43
End: 2023-01-23
Payer: COMMERCIAL

## 2023-01-23 DIAGNOSIS — R63.5 WEIGHT GAIN: ICD-10-CM

## 2023-01-23 DIAGNOSIS — G47.33 OSA ON CPAP: Primary | ICD-10-CM

## 2023-01-23 DIAGNOSIS — Z99.89 OSA ON CPAP: Primary | ICD-10-CM

## 2023-01-23 DIAGNOSIS — G47.26 SHIFT WORK SLEEP DISORDER: ICD-10-CM

## 2023-01-23 PROCEDURE — 99214 OFFICE O/P EST MOD 30 MIN: CPT | Performed by: NURSE PRACTITIONER

## 2023-01-23 RX ORDER — ARMODAFINIL 150 MG/1
150 TABLET ORAL DAILY
COMMUNITY
End: 2023-01-23 | Stop reason: SDUPTHER

## 2023-01-23 RX ORDER — ARMODAFINIL 150 MG/1
150 TABLET ORAL DAILY
Qty: 30 TABLET | Refills: 5 | Status: SHIPPED | OUTPATIENT
Start: 2023-01-23 | End: 2023-07-22

## 2023-01-23 ASSESSMENT — SLEEP AND FATIGUE QUESTIONNAIRES
HOW LIKELY ARE YOU TO NOD OFF OR FALL ASLEEP WHEN YOU ARE A PASSENGER IN A CAR FOR AN HOUR WITHOUT A BREAK: 1
HOW LIKELY ARE YOU TO NOD OFF OR FALL ASLEEP IN A CAR, WHILE STOPPED FOR A FEW MINUTES IN TRAFFIC: 0
HOW LIKELY ARE YOU TO NOD OFF OR FALL ASLEEP WHILE SITTING INACTIVE IN A PUBLIC PLACE: 1
ESS TOTAL SCORE: 4
HOW LIKELY ARE YOU TO NOD OFF OR FALL ASLEEP WHILE SITTING QUIETLY AFTER LUNCH WITHOUT ALCOHOL: 0
HOW LIKELY ARE YOU TO NOD OFF OR FALL ASLEEP WHILE SITTING AND READING: 1
HOW LIKELY ARE YOU TO NOD OFF OR FALL ASLEEP WHILE WATCHING TV: 1
HOW LIKELY ARE YOU TO NOD OFF OR FALL ASLEEP WHILE SITTING AND TALKING TO SOMEONE: 0
HOW LIKELY ARE YOU TO NOD OFF OR FALL ASLEEP WHILE LYING DOWN TO REST IN THE AFTERNOON WHEN CIRCUMSTANCES PERMIT: 0

## 2023-02-15 ENCOUNTER — TELEMEDICINE (OUTPATIENT)
Dept: FAMILY MEDICINE CLINIC | Facility: CLINIC | Age: 43
End: 2023-02-15
Payer: COMMERCIAL

## 2023-02-15 DIAGNOSIS — I10 PRIMARY HYPERTENSION: ICD-10-CM

## 2023-02-15 DIAGNOSIS — M1A.9XX0 CHRONIC GOUT WITHOUT TOPHUS, UNSPECIFIED CAUSE, UNSPECIFIED SITE: Primary | ICD-10-CM

## 2023-02-15 DIAGNOSIS — G47.33 OSA (OBSTRUCTIVE SLEEP APNEA): ICD-10-CM

## 2023-02-15 PROCEDURE — 99442 PR PHYS/QHP TELEPHONE EVALUATION 11-20 MIN: CPT | Performed by: FAMILY MEDICINE

## 2023-02-15 SDOH — ECONOMIC STABILITY: TRANSPORTATION INSECURITY
IN THE PAST 12 MONTHS, HAS LACK OF TRANSPORTATION KEPT YOU FROM MEETINGS, WORK, OR FROM GETTING THINGS NEEDED FOR DAILY LIVING?: NO

## 2023-02-15 SDOH — ECONOMIC STABILITY: INCOME INSECURITY: HOW HARD IS IT FOR YOU TO PAY FOR THE VERY BASICS LIKE FOOD, HOUSING, MEDICAL CARE, AND HEATING?: NOT HARD AT ALL

## 2023-02-15 SDOH — ECONOMIC STABILITY: FOOD INSECURITY: WITHIN THE PAST 12 MONTHS, YOU WORRIED THAT YOUR FOOD WOULD RUN OUT BEFORE YOU GOT MONEY TO BUY MORE.: NEVER TRUE

## 2023-02-15 SDOH — ECONOMIC STABILITY: FOOD INSECURITY: WITHIN THE PAST 12 MONTHS, THE FOOD YOU BOUGHT JUST DIDN'T LAST AND YOU DIDN'T HAVE MONEY TO GET MORE.: NEVER TRUE

## 2023-02-15 SDOH — ECONOMIC STABILITY: HOUSING INSECURITY
IN THE LAST 12 MONTHS, WAS THERE A TIME WHEN YOU DID NOT HAVE A STEADY PLACE TO SLEEP OR SLEPT IN A SHELTER (INCLUDING NOW)?: NO

## 2023-02-15 ASSESSMENT — PATIENT HEALTH QUESTIONNAIRE - PHQ9
SUM OF ALL RESPONSES TO PHQ QUESTIONS 1-9: 0
SUM OF ALL RESPONSES TO PHQ QUESTIONS 1-9: 0
SUM OF ALL RESPONSES TO PHQ9 QUESTIONS 1 & 2: 0
2. FEELING DOWN, DEPRESSED OR HOPELESS: 0
SUM OF ALL RESPONSES TO PHQ QUESTIONS 1-9: 0
SUM OF ALL RESPONSES TO PHQ QUESTIONS 1-9: 0
1. LITTLE INTEREST OR PLEASURE IN DOING THINGS: 0

## 2023-02-15 NOTE — PROGRESS NOTES
SUBJECTIVE:   Irving Reed is a 36 y.o. male who has a past medical history significant for hicups, hypertension, gout, renal insufficiency, sleep apnea, and morbid obesity. Patient presents today for virtual visit via telephone encounter. Review of systems reveals no complaints of chest pain, shortness of breath, orthopnea or PND. GI and  review of systems is unremarkable. Current medications are listed in the EMR and reviewed today. Patient's vital signs were not performed as encounter was performed virtually. Location of virtual visit was patient's home. HPI  See above    Past Medical History, Past Surgical History, Family history, Social History, and Medications were all reviewed with the patient today and updated as necessary. Current Outpatient Medications   Medication Sig Dispense Refill    Armodafinil (NUVIGIL) 150 MG TABS tablet Take 1 tablet by mouth daily for 180 days. Max Daily Amount: 150 mg 30 tablet 5    allopurinol (ZYLOPRIM) 300 MG tablet TAKE 1 TABLET DAILY AS DIRECTED 90 tablet 3    amLODIPine (NORVASC) 5 MG tablet TAKE 1 TABLET DAILY 90 tablet 3    sildenafil (VIAGRA) 100 MG tablet TAKE 1 TABLET BY MOUTH AS NEEDED. TAKE 1/2 TO 1 FULL TABLET 45 MIN PRIOR TO INTERCOURSE. No current facility-administered medications for this visit. Allergies   Allergen Reactions    Prednisone Other (See Comments)     hiccups  Patient states that he gets the hiccups.   hiccups       Patient Active Problem List   Diagnosis    Hiccups    Morbid obesity ([address]    Shift work sleep disorder    Folliculitis    Weight gain    Gout    ELEAZAR on CPAP    Essential hypertension    Diverticulitis of large intestine    Patellar tendinitis of left knee     Past Medical History:   Diagnosis Date    Essential hypertension 6/49/5294    Folliculitis APPROX 0219    SCALP,    Gout 2002    3-4X/YEAR SINCE '09, LEFT GREAT TOE    ELEAZAR on CPAP     Patellar tendinitis of left knee 4/6/2016     Past Surgical History:   Procedure Laterality Date    VASECTOMY       Family History   Problem Relation Age of Onset    Cancer Neg Hx         no prostate cancer    Hypertension Mother     Hypertension Father     Diabetes Sister      Social History     Tobacco Use    Smoking status: Never    Smokeless tobacco: Never   Substance Use Topics    Alcohol use: Yes         Review of Systems  See above    OBJECTIVE:  There were no vitals taken for this visit. Physical Exam    Medical problems and test results were reviewed with the patient today. ASSESSMENT and PLAN    1.  Gout. Uric acid level is 5.0. No complaints of gouty attacks. Continue allopurinol. 2.  Hypertension. BP is well maintained. Continue to monitor at home. TSH, CBC, metabolic panel and lipid panel unremarkable. 3.  Sleep apnea. Continue CPAP. No complaints. Consent:    Najma Garcia was evaluated through a synchronous (real-time) audio encounter. Patient identification was verified at the start of the visit. He (or guardian if applicable) is aware that this is a billable service, which includes applicable co-pays. This visit was conducted with the patient's (and/or legal guardian's) verbal consent. He has not had a related appointment within my department in the past 7 days or scheduled within the next 24 hours. The patient was located at Home: [address]. The provider was located at [facility] ([address] [place], [place] [address]t): [address] [place] [address] Dr Marika Richter [address], [address]. Note: not billable if this call serves to triage the patient into an appointment for the relevant concern    On this date 2/15/2023 I have spent 15 minutes reviewing previous notes, test results and face to face with the patient discussing the diagnosis and importance of compliance with the treatment plan as well as documenting on the day of the visit.  Greater than 50% of this visit was spent counseling the patient about test results, prognosis, importance of compliance, education about disease process, benefits of medications, instructions for management of acute symptoms, and follow up plans. --Familia Haywood MD on 2/15/2023 at 5:11 PM    An electronic signature was used to authenticate this note.

## 2023-04-24 RX ORDER — SILDENAFIL 100 MG/1
100 TABLET, FILM COATED ORAL PRN
Qty: 6 TABLET | Refills: 5 | Status: SHIPPED | OUTPATIENT
Start: 2023-04-24

## 2023-06-09 RX ORDER — MONTELUKAST SODIUM 10 MG/1
10 TABLET ORAL DAILY
Qty: 30 TABLET | Refills: 0 | Status: SHIPPED | OUTPATIENT
Start: 2023-06-09 | End: 2023-07-09

## 2023-06-26 RX ORDER — MONTELUKAST SODIUM 10 MG/1
TABLET ORAL
Qty: 21 TABLET | Refills: 1 | OUTPATIENT
Start: 2023-06-26

## 2023-09-29 NOTE — PROGRESS NOTES
CPAP Unit  (     ) BiLevel Unit  (     ) Auto BiLevel Unit  (     ) ASV        (     ) Bilevel ST      Length of need: 12 months    Pressure:  14 cmH20  EPR:     Starting Ramp Pressure:   cm H20  Ramp Time: min        Patient had a diagnostic Apnea Hypopnea Index (AHI) of :    *SUPPLIES* Replace all as needed, or per coverage guidelines     Masks Type:  (    ) -Full Face Mask (1 per 3 mon)  (    ) -Full Mask (1 per month) Interface/Cushion      ( x ) -Nasal Mask (1 per 3 mon)  ( x  ) - Nasal Mask (1 per month) Interface/Cushion  (  x   ) -Pillow (2 per mon)  (     ) -Uwknzafoo (1 per 6 mon)            Other Supplies:    (   X  )-Cdmzafwj (1 per 6 mon)  (   X  )-Nnwuqx Tubing (1 per 3 mon)  (   X  )- Disposable Filter (2 per mon)  (   x  )-Scnozh Humidifier (1 per year)     (  x   )-Esjstyjjf (sometimes used with Full Face Mask) (1 per 6 mos)  (    )-Tubing-without heat (1 per 3 mos)  (     )-Non-Disposable Filter (1 per 6 mos)  (  x   )-Water Chamber (1 per 6 mos)  (     )-Humidifier non-heated (1 per 5 yrs)      Signed Date: 10/3/2023  Electronically Signed By: SARA Encarnacion CNP  Electronically Dated:  10/3/2023     Orders Placed This Encounter   Medications    modafinil (PROVIGIL) 100 MG tablet     Sig: Take 1 tablet by mouth daily for 180 days. Max Daily Amount: 100 mg     Dispense:  90 tablet     Refill:  1         Collaborating Physician: Dr. Geraldo Thorpe office visit was spent  reviewing test results, prognosis, importance of compliance, education about disease process, benefits of medications, instructions for management of acute flare-ups, and follow up plans. Total time spent with patient was 22 minutes.         SARA Encarnacion CNP  Electronically signed

## 2023-10-03 ENCOUNTER — OFFICE VISIT (OUTPATIENT)
Dept: SLEEP MEDICINE | Age: 43
End: 2023-10-03
Payer: COMMERCIAL

## 2023-10-03 VITALS
DIASTOLIC BLOOD PRESSURE: 86 MMHG | SYSTOLIC BLOOD PRESSURE: 130 MMHG | HEIGHT: 70 IN | WEIGHT: 300 LBS | HEART RATE: 95 BPM | BODY MASS INDEX: 42.95 KG/M2 | OXYGEN SATURATION: 96 % | RESPIRATION RATE: 16 BRPM

## 2023-10-03 DIAGNOSIS — G47.26 SHIFT WORK SLEEP DISORDER: ICD-10-CM

## 2023-10-03 DIAGNOSIS — E66.01 MORBID OBESITY (HCC): ICD-10-CM

## 2023-10-03 DIAGNOSIS — G47.33 OSA ON CPAP: Primary | ICD-10-CM

## 2023-10-03 PROCEDURE — 3079F DIAST BP 80-89 MM HG: CPT | Performed by: NURSE PRACTITIONER

## 2023-10-03 PROCEDURE — 99214 OFFICE O/P EST MOD 30 MIN: CPT | Performed by: NURSE PRACTITIONER

## 2023-10-03 PROCEDURE — 3075F SYST BP GE 130 - 139MM HG: CPT | Performed by: NURSE PRACTITIONER

## 2023-10-03 RX ORDER — MODAFINIL 100 MG/1
100 TABLET ORAL DAILY
Qty: 90 TABLET | Refills: 1 | Status: SHIPPED | OUTPATIENT
Start: 2023-10-03 | End: 2024-03-31

## 2023-10-03 ASSESSMENT — SLEEP AND FATIGUE QUESTIONNAIRES
HOW LIKELY ARE YOU TO NOD OFF OR FALL ASLEEP WHILE WATCHING TV: 0
HOW LIKELY ARE YOU TO NOD OFF OR FALL ASLEEP WHILE SITTING INACTIVE IN A PUBLIC PLACE: 0
HOW LIKELY ARE YOU TO NOD OFF OR FALL ASLEEP IN A CAR, WHILE STOPPED FOR A FEW MINUTES IN TRAFFIC: 0
HOW LIKELY ARE YOU TO NOD OFF OR FALL ASLEEP WHILE SITTING QUIETLY AFTER LUNCH WITHOUT ALCOHOL: 0
HOW LIKELY ARE YOU TO NOD OFF OR FALL ASLEEP WHILE LYING DOWN TO REST IN THE AFTERNOON WHEN CIRCUMSTANCES PERMIT: 2
HOW LIKELY ARE YOU TO NOD OFF OR FALL ASLEEP WHEN YOU ARE A PASSENGER IN A CAR FOR AN HOUR WITHOUT A BREAK: 0
ESS TOTAL SCORE: 2
HOW LIKELY ARE YOU TO NOD OFF OR FALL ASLEEP WHILE SITTING AND TALKING TO SOMEONE: 0
HOW LIKELY ARE YOU TO NOD OFF OR FALL ASLEEP WHILE SITTING AND READING: 0

## 2023-11-17 ENCOUNTER — CLINICAL DOCUMENTATION (OUTPATIENT)
Dept: SLEEP MEDICINE | Age: 43
End: 2023-11-17

## 2024-02-05 RX ORDER — ALLOPURINOL 300 MG/1
TABLET ORAL
Qty: 30 TABLET | Refills: 0 | Status: SHIPPED | OUTPATIENT
Start: 2024-02-05

## 2024-02-05 RX ORDER — AMLODIPINE BESYLATE 5 MG/1
TABLET ORAL
Qty: 30 TABLET | Refills: 0 | Status: SHIPPED | OUTPATIENT
Start: 2024-02-05

## 2024-03-26 DIAGNOSIS — I10 ESSENTIAL HYPERTENSION: Primary | ICD-10-CM

## 2024-03-26 DIAGNOSIS — Z00.00 LABORATORY EXAMINATION ORDERED AS PART OF A ROUTINE GENERAL MEDICAL EXAMINATION: ICD-10-CM

## 2024-03-26 DIAGNOSIS — M1A.9XX0 CHRONIC GOUT WITHOUT TOPHUS, UNSPECIFIED CAUSE, UNSPECIFIED SITE: ICD-10-CM

## 2024-03-26 DIAGNOSIS — E78.00 ELEVATED LOW-DENSITY LIPOPROTEIN LEVEL: ICD-10-CM

## 2024-03-26 RX ORDER — SILDENAFIL 100 MG/1
100 TABLET, FILM COATED ORAL PRN
Qty: 6 TABLET | Refills: 0 | Status: SHIPPED | OUTPATIENT
Start: 2024-03-26

## 2024-03-26 RX ORDER — MONTELUKAST SODIUM 10 MG/1
10 TABLET ORAL DAILY
Qty: 30 TABLET | Refills: 0 | Status: SHIPPED | OUTPATIENT
Start: 2024-03-26 | End: 2024-04-25

## 2024-04-01 ENCOUNTER — TELEPHONE (OUTPATIENT)
Dept: SLEEP MEDICINE | Age: 44
End: 2024-04-01

## 2024-04-03 ENCOUNTER — NURSE ONLY (OUTPATIENT)
Dept: FAMILY MEDICINE CLINIC | Facility: CLINIC | Age: 44
End: 2024-04-03

## 2024-04-03 DIAGNOSIS — I10 ESSENTIAL HYPERTENSION: ICD-10-CM

## 2024-04-03 DIAGNOSIS — Z00.00 LABORATORY EXAMINATION ORDERED AS PART OF A ROUTINE GENERAL MEDICAL EXAMINATION: Primary | ICD-10-CM

## 2024-04-03 DIAGNOSIS — E78.00 ELEVATED LOW-DENSITY LIPOPROTEIN LEVEL: ICD-10-CM

## 2024-04-03 DIAGNOSIS — M1A.9XX0 CHRONIC GOUT WITHOUT TOPHUS, UNSPECIFIED CAUSE, UNSPECIFIED SITE: ICD-10-CM

## 2024-04-03 LAB
BASOPHILS # BLD: 0 K/UL (ref 0–0.2)
BASOPHILS NFR BLD: 1 % (ref 0–2)
DIFFERENTIAL METHOD BLD: ABNORMAL
EOSINOPHIL # BLD: 0.1 K/UL (ref 0–0.8)
EOSINOPHIL NFR BLD: 2 % (ref 0.5–7.8)
ERYTHROCYTE [DISTWIDTH] IN BLOOD BY AUTOMATED COUNT: 14.1 % (ref 11.9–14.6)
HCT VFR BLD AUTO: 42.2 % (ref 41.1–50.3)
HGB BLD-MCNC: 13.8 G/DL (ref 13.6–17.2)
IMM GRANULOCYTES # BLD AUTO: 0 K/UL (ref 0–0.5)
IMM GRANULOCYTES NFR BLD AUTO: 0 % (ref 0–5)
LYMPHOCYTES # BLD: 1.8 K/UL (ref 0.5–4.6)
LYMPHOCYTES NFR BLD: 27 % (ref 13–44)
MCH RBC QN AUTO: 30.1 PG (ref 26.1–32.9)
MCHC RBC AUTO-ENTMCNC: 32.7 G/DL (ref 31.4–35)
MCV RBC AUTO: 92.1 FL (ref 82–102)
MONOCYTES # BLD: 0.6 K/UL (ref 0.1–1.3)
MONOCYTES NFR BLD: 9 % (ref 4–12)
NEUTS SEG # BLD: 4.2 K/UL (ref 1.7–8.2)
NEUTS SEG NFR BLD: 61 % (ref 43–78)
NRBC # BLD: 0 K/UL (ref 0–0.2)
PLATELET # BLD AUTO: 400 K/UL (ref 150–450)
PMV BLD AUTO: 9 FL (ref 9.4–12.3)
RBC # BLD AUTO: 4.58 M/UL (ref 4.23–5.6)
WBC # BLD AUTO: 6.8 K/UL (ref 4.3–11.1)

## 2024-04-04 LAB
ALBUMIN SERPL-MCNC: 3.6 G/DL (ref 3.5–5)
ALBUMIN/GLOB SERPL: 1.1 (ref 0.4–1.6)
ALP SERPL-CCNC: 86 U/L (ref 50–136)
ALT SERPL-CCNC: 34 U/L (ref 12–65)
ANION GAP SERPL CALC-SCNC: 4 MMOL/L (ref 2–11)
AST SERPL-CCNC: 27 U/L (ref 15–37)
BILIRUB SERPL-MCNC: 0.5 MG/DL (ref 0.2–1.1)
BUN SERPL-MCNC: 23 MG/DL (ref 6–23)
CALCIUM SERPL-MCNC: 9.6 MG/DL (ref 8.3–10.4)
CHLORIDE SERPL-SCNC: 105 MMOL/L (ref 103–113)
CHOLEST SERPL-MCNC: 202 MG/DL
CO2 SERPL-SCNC: 30 MMOL/L (ref 21–32)
CREAT SERPL-MCNC: 1.5 MG/DL (ref 0.8–1.5)
GLOBULIN SER CALC-MCNC: 3.4 G/DL (ref 2.8–4.5)
GLUCOSE SERPL-MCNC: 118 MG/DL (ref 65–100)
HDLC SERPL-MCNC: 50 MG/DL (ref 40–60)
HDLC SERPL: 4
LDLC SERPL CALC-MCNC: 140.4 MG/DL
POTASSIUM SERPL-SCNC: 4.3 MMOL/L (ref 3.5–5.1)
PROT SERPL-MCNC: 7 G/DL (ref 6.3–8.2)
SODIUM SERPL-SCNC: 139 MMOL/L (ref 136–146)
TRIGL SERPL-MCNC: 58 MG/DL (ref 35–150)
TSH, 3RD GENERATION: 0.78 UIU/ML (ref 0.36–3.74)
URATE SERPL-MCNC: 6.7 MG/DL (ref 2.6–6)
VLDLC SERPL CALC-MCNC: 11.6 MG/DL (ref 6–23)

## 2024-04-06 ASSESSMENT — PATIENT HEALTH QUESTIONNAIRE - PHQ9
SUM OF ALL RESPONSES TO PHQ QUESTIONS 1-9: 0
1. LITTLE INTEREST OR PLEASURE IN DOING THINGS: NOT AT ALL
SUM OF ALL RESPONSES TO PHQ QUESTIONS 1-9: 0
SUM OF ALL RESPONSES TO PHQ QUESTIONS 1-9: 0
2. FEELING DOWN, DEPRESSED OR HOPELESS: NOT AT ALL
SUM OF ALL RESPONSES TO PHQ QUESTIONS 1-9: 0
1. LITTLE INTEREST OR PLEASURE IN DOING THINGS: NOT AT ALL
SUM OF ALL RESPONSES TO PHQ9 QUESTIONS 1 & 2: 0
2. FEELING DOWN, DEPRESSED OR HOPELESS: NOT AT ALL
SUM OF ALL RESPONSES TO PHQ9 QUESTIONS 1 & 2: 0

## 2024-04-08 ENCOUNTER — TELEPHONE (OUTPATIENT)
Dept: SLEEP MEDICINE | Age: 44
End: 2024-04-08

## 2024-04-09 ENCOUNTER — OFFICE VISIT (OUTPATIENT)
Dept: FAMILY MEDICINE CLINIC | Facility: CLINIC | Age: 44
End: 2024-04-09
Payer: COMMERCIAL

## 2024-04-09 VITALS
WEIGHT: 307.6 LBS | SYSTOLIC BLOOD PRESSURE: 132 MMHG | HEIGHT: 70 IN | DIASTOLIC BLOOD PRESSURE: 80 MMHG | BODY MASS INDEX: 44.03 KG/M2

## 2024-04-09 DIAGNOSIS — E78.00 PURE HYPERCHOLESTEROLEMIA: ICD-10-CM

## 2024-04-09 DIAGNOSIS — G47.33 OSA (OBSTRUCTIVE SLEEP APNEA): ICD-10-CM

## 2024-04-09 DIAGNOSIS — I10 PRIMARY HYPERTENSION: ICD-10-CM

## 2024-04-09 DIAGNOSIS — Z87.39 HISTORY OF GOUT: ICD-10-CM

## 2024-04-09 DIAGNOSIS — E66.01 MORBID OBESITY (HCC): ICD-10-CM

## 2024-04-09 DIAGNOSIS — Z00.00 ROUTINE GENERAL MEDICAL EXAMINATION AT A HEALTH CARE FACILITY: Primary | ICD-10-CM

## 2024-04-09 PROCEDURE — 3075F SYST BP GE 130 - 139MM HG: CPT | Performed by: FAMILY MEDICINE

## 2024-04-09 PROCEDURE — 99396 PREV VISIT EST AGE 40-64: CPT | Performed by: FAMILY MEDICINE

## 2024-04-09 PROCEDURE — 3079F DIAST BP 80-89 MM HG: CPT | Performed by: FAMILY MEDICINE

## 2024-04-09 RX ORDER — AMLODIPINE BESYLATE 5 MG/1
5 TABLET ORAL DAILY
Qty: 90 TABLET | Refills: 3 | Status: SHIPPED | OUTPATIENT
Start: 2024-04-09

## 2024-04-09 RX ORDER — MONTELUKAST SODIUM 10 MG/1
10 TABLET ORAL DAILY
Qty: 90 TABLET | Refills: 3 | Status: SHIPPED | OUTPATIENT
Start: 2024-04-09 | End: 2025-04-04

## 2024-04-09 RX ORDER — ALLOPURINOL 300 MG/1
TABLET ORAL
Qty: 90 TABLET | Refills: 3 | Status: SHIPPED | OUTPATIENT
Start: 2024-04-09

## 2024-04-09 SDOH — ECONOMIC STABILITY: FOOD INSECURITY: WITHIN THE PAST 12 MONTHS, THE FOOD YOU BOUGHT JUST DIDN'T LAST AND YOU DIDN'T HAVE MONEY TO GET MORE.: NEVER TRUE

## 2024-04-09 SDOH — ECONOMIC STABILITY: FOOD INSECURITY: WITHIN THE PAST 12 MONTHS, YOU WORRIED THAT YOUR FOOD WOULD RUN OUT BEFORE YOU GOT MONEY TO BUY MORE.: NEVER TRUE

## 2024-04-09 SDOH — ECONOMIC STABILITY: INCOME INSECURITY: HOW HARD IS IT FOR YOU TO PAY FOR THE VERY BASICS LIKE FOOD, HOUSING, MEDICAL CARE, AND HEATING?: NOT HARD AT ALL

## 2024-04-09 NOTE — PROGRESS NOTES
No focal deficit present.      Mental Status: He is alert and oriented to person, place, and time.      Cranial Nerves: No cranial nerve deficit.      Motor: No weakness.      Deep Tendon Reflexes: Reflexes normal.   Psychiatric:         Mood and Affect: Mood normal.         Behavior: Behavior normal.         Thought Content: Thought content normal.         Judgment: Judgment normal.         Medical problems and test results were reviewed with the patient today.         ASSESSMENT and PLAN    1.  CPX.  Anticipatory guidance discussed including the importance of sunscreen use, helmet use and seatbelt use.  Screening colonoscopy is up-to-date.    2.  Hypertension.  BP is 132/80.  Renal function and electrolytes are normal.  Creatinine is 1.5.  Continue current therapy.    3.  Gout.  Uric acid level 6.7.  Previously 5.0.  He is asymptomatic.  Refocus on dietary management.  Continue allopurinol.  Admits poor dietary habits.    4.  High cholesterol.  LDL is 140.  Previously 116.  Continue dietary focus and consider low-dose statin if LDL continues to rise.    5.  Sleep apnea.  Continue CPAP.  No complaints.    6.  Morbid obesity.  BMI 44.1.  Dietary focus.    Elements of this note have been dictated using speech recognition software. As a result, errors of speech recognition may have occurred.

## 2024-04-11 NOTE — PROGRESS NOTES
Unit  (     ) Auto CPAP Unit  (     ) BiLevel Unit  (     ) Auto BiLevel Unit  (     ) ASV        (     ) Bilevel ST      Length of need: 12 months    Pressure:   cmH20  EPR:     Starting Ramp Pressure:   cm H20  Ramp Time: min        Patient had a diagnostic Apnea Hypopnea Index (AHI) of :    *SUPPLIES* Replace all as needed, or per coverage guidelines     Masks Type:  (    ) -Full Face Mask (1 per 3 mon)  (    ) -Full Mask (1 per month) Interface/Cushion      ( x ) -Nasal Mask (1 per 3 mon)  ( x  ) - Nasal Mask (1 per month) Interface/Cushion  (  x   ) -Pillow (2 per mon)  (     ) -Jyofyrxmb (1 per 6 mon)            Other Supplies:    (   X  )-Twfvzmgq (1 per 6 mon)  (   X  )-Rnnvlp Tubing (1 per 3 mon)  (   X  )- Disposable Filter (2 per mon)  (   x  )-Nipjki Humidifier (1 per year)     (  x   )-Pbxkictrv (sometimes used with Full Face Mask) (1 per 6 mos)  (    )-Tubing-without heat (1 per 3 mos)  (     )-Non-Disposable Filter (1 per 6 mos)  (  x   )-Water Chamber (1 per 6 mos)  (     )-Humidifier non-heated (1 per 5 yrs)      Signed Date: 4/12/2024  Electronically Signed By: YOSEF Aguirre  Electronically Dated:  4/12/2024     Orders Placed This Encounter   Medications    modafinil (PROVIGIL) 100 MG tablet     Sig: Take 2 tablets by mouth daily for 180 days. Max Daily Amount: 200 mg     Dispense:  180 tablet     Refill:  1         Collaborating Physician: Dr. Trevino    Over 50% of today's office visit was spent in face to face time reviewing test results, prognosis, importance of compliance, education about disease process, benefits of medications, instructions for management of acute flare-ups, and follow up plans.  Total face to face time spent with patient was 21 minutes.        YOSEF Aguirre  Electronically signed

## 2024-04-12 ENCOUNTER — TELEMEDICINE (OUTPATIENT)
Dept: SLEEP MEDICINE | Age: 44
End: 2024-04-12
Payer: COMMERCIAL

## 2024-04-12 DIAGNOSIS — G47.26 SHIFT WORK SLEEP DISORDER: ICD-10-CM

## 2024-04-12 DIAGNOSIS — G47.33 OSA ON CPAP: Primary | ICD-10-CM

## 2024-04-12 PROCEDURE — 99213 OFFICE O/P EST LOW 20 MIN: CPT | Performed by: PHYSICIAN ASSISTANT

## 2024-04-12 RX ORDER — MODAFINIL 100 MG/1
200 TABLET ORAL DAILY
Qty: 180 TABLET | Refills: 1 | Status: SHIPPED | OUTPATIENT
Start: 2024-04-12 | End: 2024-10-09

## 2024-04-12 ASSESSMENT — SLEEP AND FATIGUE QUESTIONNAIRES
HOW LIKELY ARE YOU TO NOD OFF OR FALL ASLEEP WHEN YOU ARE A PASSENGER IN A CAR FOR AN HOUR WITHOUT A BREAK: WOULD NEVER DOZE
HOW LIKELY ARE YOU TO NOD OFF OR FALL ASLEEP WHILE LYING DOWN TO REST IN THE AFTERNOON WHEN CIRCUMSTANCES PERMIT: MODERATE CHANCE OF DOZING
HOW LIKELY ARE YOU TO NOD OFF OR FALL ASLEEP WHILE SITTING QUIETLY AFTER LUNCH WITHOUT ALCOHOL: WOULD NEVER DOZE
HOW LIKELY ARE YOU TO NOD OFF OR FALL ASLEEP WHILE WATCHING TV: WOULD NEVER DOZE
HOW LIKELY ARE YOU TO NOD OFF OR FALL ASLEEP WHILE SITTING AND READING: WOULD NEVER DOZE
ESS TOTAL SCORE: 2
HOW LIKELY ARE YOU TO NOD OFF OR FALL ASLEEP WHILE SITTING INACTIVE IN A PUBLIC PLACE: WOULD NEVER DOZE
HOW LIKELY ARE YOU TO NOD OFF OR FALL ASLEEP IN A CAR, WHILE STOPPED FOR A FEW MINUTES IN TRAFFIC: WOULD NEVER DOZE
HOW LIKELY ARE YOU TO NOD OFF OR FALL ASLEEP WHILE SITTING AND TALKING TO SOMEONE: WOULD NEVER DOZE

## 2024-08-01 RX ORDER — SILDENAFIL 100 MG/1
100 TABLET, FILM COATED ORAL PRN
Qty: 6 TABLET | Refills: 5 | Status: SHIPPED | OUTPATIENT
Start: 2024-08-01

## 2024-10-09 ENCOUNTER — LAB (OUTPATIENT)
Dept: FAMILY MEDICINE CLINIC | Facility: CLINIC | Age: 44
End: 2024-10-09

## 2024-10-09 DIAGNOSIS — E78.00 PURE HYPERCHOLESTEROLEMIA: ICD-10-CM

## 2024-10-09 DIAGNOSIS — I10 PRIMARY HYPERTENSION: ICD-10-CM

## 2024-10-09 DIAGNOSIS — Z87.39 HISTORY OF GOUT: ICD-10-CM

## 2024-10-09 LAB
ALBUMIN SERPL-MCNC: 3.6 G/DL (ref 3.5–5)
ALBUMIN/GLOB SERPL: 1 (ref 1–1.9)
ALP SERPL-CCNC: 95 U/L (ref 40–129)
ALT SERPL-CCNC: 25 U/L (ref 8–55)
ANION GAP SERPL CALC-SCNC: 9 MMOL/L (ref 9–18)
AST SERPL-CCNC: 27 U/L (ref 15–37)
BILIRUB SERPL-MCNC: 0.5 MG/DL (ref 0–1.2)
BUN SERPL-MCNC: 17 MG/DL (ref 6–23)
CALCIUM SERPL-MCNC: 9.7 MG/DL (ref 8.8–10.2)
CHLORIDE SERPL-SCNC: 101 MMOL/L (ref 98–107)
CHOLEST SERPL-MCNC: 196 MG/DL (ref 0–200)
CO2 SERPL-SCNC: 31 MMOL/L (ref 20–28)
CREAT SERPL-MCNC: 1.47 MG/DL (ref 0.8–1.3)
GLOBULIN SER CALC-MCNC: 3.6 G/DL (ref 2.3–3.5)
GLUCOSE SERPL-MCNC: 138 MG/DL (ref 70–99)
HDLC SERPL-MCNC: 46 MG/DL (ref 40–60)
HDLC SERPL: 4.3 (ref 0–5)
LDLC SERPL CALC-MCNC: 134 MG/DL (ref 0–100)
POTASSIUM SERPL-SCNC: 4.3 MMOL/L (ref 3.5–5.1)
PROT SERPL-MCNC: 7.2 G/DL (ref 6.3–8.2)
SODIUM SERPL-SCNC: 141 MMOL/L (ref 136–145)
TRIGL SERPL-MCNC: 79 MG/DL (ref 0–150)
URATE SERPL-MCNC: 5.7 MG/DL (ref 3.9–8.2)
VLDLC SERPL CALC-MCNC: 16 MG/DL (ref 6–23)

## 2024-10-21 ENCOUNTER — OFFICE VISIT (OUTPATIENT)
Dept: FAMILY MEDICINE CLINIC | Facility: CLINIC | Age: 44
End: 2024-10-21

## 2024-10-21 VITALS
HEART RATE: 88 BPM | OXYGEN SATURATION: 98 % | BODY MASS INDEX: 44.12 KG/M2 | DIASTOLIC BLOOD PRESSURE: 86 MMHG | HEIGHT: 70 IN | WEIGHT: 308.2 LBS | SYSTOLIC BLOOD PRESSURE: 138 MMHG

## 2024-10-21 DIAGNOSIS — R73.9 ELEVATED BLOOD SUGAR: ICD-10-CM

## 2024-10-21 DIAGNOSIS — I10 PRIMARY HYPERTENSION: Primary | ICD-10-CM

## 2024-10-21 DIAGNOSIS — G47.33 OSA ON CPAP: ICD-10-CM

## 2024-10-21 DIAGNOSIS — M1A.9XX0 CHRONIC GOUT WITHOUT TOPHUS, UNSPECIFIED CAUSE, UNSPECIFIED SITE: ICD-10-CM

## 2024-10-21 DIAGNOSIS — E66.01 MORBID OBESITY: ICD-10-CM

## 2024-10-21 DIAGNOSIS — E78.00 PURE HYPERCHOLESTEROLEMIA: ICD-10-CM

## 2024-10-21 ASSESSMENT — PATIENT HEALTH QUESTIONNAIRE - PHQ9
SUM OF ALL RESPONSES TO PHQ QUESTIONS 1-9: 0
2. FEELING DOWN, DEPRESSED OR HOPELESS: NOT AT ALL
1. LITTLE INTEREST OR PLEASURE IN DOING THINGS: NOT AT ALL
SUM OF ALL RESPONSES TO PHQ QUESTIONS 1-9: 0
SUM OF ALL RESPONSES TO PHQ9 QUESTIONS 1 & 2: 0

## 2024-10-21 NOTE — PROGRESS NOTES
SUBJECTIVE:   Sergio Guerra is a 44 y.o. male who has a past medical history significant for hypertension, gout, high cholesterol, sleep apnea and morbid obesity. Review of systems reveals no complaints of chest pain, shortness of breath, orthopnea or PND. GI and  review of systems is unremarkable. Current medicines are listed in the EMR and reviewed today.     HPI  See above    Past Medical History, Past Surgical History, Family history, Social History, and Medications were all reviewed with the patient today and updated as necessary.       Current Outpatient Medications   Medication Sig Dispense Refill    sildenafil (VIAGRA) 100 MG tablet TAKE 1 TABLET BY MOUTH AS NEEDED FOR ERECTILE DYSFUNCTION 6 tablet 5    allopurinol (ZYLOPRIM) 300 MG tablet TAKE ONE TABLET BY MOUTH DAILY 90 tablet 3    amLODIPine (NORVASC) 5 MG tablet Take 1 tablet by mouth daily 90 tablet 3    montelukast (SINGULAIR) 10 MG tablet Take 1 tablet by mouth daily 90 tablet 3     No current facility-administered medications for this visit.     Allergies   Allergen Reactions    Prednisone Other (See Comments)     hiccups  Patient states that he gets the hiccups.  hiccups       Patient Active Problem List   Diagnosis    Hiccups    Morbid obesity    Shift work sleep disorder    Folliculitis    Weight gain    Gout    ELEAZAR on CPAP    Essential hypertension    Diverticulitis of large intestine    Patellar tendinitis of left knee     Past Medical History:   Diagnosis Date    Essential hypertension 6/17/2021    Folliculitis APPROX 2007    SCALP,    Gout 2002    3-4X/YEAR SINCE '09, LEFT GREAT TOE    ELEAZAR on CPAP     Patellar tendinitis of left knee 4/6/2016     Past Surgical History:   Procedure Laterality Date    VASECTOMY       Family History   Problem Relation Age of Onset    Cancer Neg Hx         no prostate cancer    Hypertension Mother     Hypertension Father     Diabetes Sister      Social History     Tobacco Use    Smoking status: Never

## 2024-10-21 NOTE — PROGRESS NOTES
\"Have you been to the ER, urgent care clinic since your last visit?  Hospitalized since your last visit?\"    NO    “Have you seen or consulted any other health care providers outside our system since your last visit?”    NO      “Have you had a colorectal cancer screening such as a colonoscopy/FIT/Cologuard?    YES - Type: Colonoscopy - Where: [facility] 2/19/19 Nurse/CMA to request most recent records if not in the chart     No colonoscopy on file  No cologuard on file  No FIT/FOBT on file   No flexible sigmoidoscopy on file

## 2025-01-20 RX ORDER — ALLOPURINOL 300 MG/1
TABLET ORAL
Qty: 90 TABLET | Refills: 1 | Status: SHIPPED | OUTPATIENT
Start: 2025-01-20

## 2025-01-20 RX ORDER — AMLODIPINE BESYLATE 5 MG/1
5 TABLET ORAL DAILY
Qty: 90 TABLET | Refills: 1 | Status: SHIPPED | OUTPATIENT
Start: 2025-01-20

## 2025-02-09 SDOH — ECONOMIC STABILITY: TRANSPORTATION INSECURITY
IN THE PAST 12 MONTHS, HAS THE LACK OF TRANSPORTATION KEPT YOU FROM MEDICAL APPOINTMENTS OR FROM GETTING MEDICATIONS?: NO

## 2025-02-09 SDOH — ECONOMIC STABILITY: INCOME INSECURITY: IN THE LAST 12 MONTHS, WAS THERE A TIME WHEN YOU WERE NOT ABLE TO PAY THE MORTGAGE OR RENT ON TIME?: NO

## 2025-02-09 SDOH — ECONOMIC STABILITY: FOOD INSECURITY: WITHIN THE PAST 12 MONTHS, THE FOOD YOU BOUGHT JUST DIDN'T LAST AND YOU DIDN'T HAVE MONEY TO GET MORE.: NEVER TRUE

## 2025-02-09 SDOH — ECONOMIC STABILITY: FOOD INSECURITY: WITHIN THE PAST 12 MONTHS, YOU WORRIED THAT YOUR FOOD WOULD RUN OUT BEFORE YOU GOT MONEY TO BUY MORE.: NEVER TRUE

## 2025-02-09 ASSESSMENT — PATIENT HEALTH QUESTIONNAIRE - PHQ9
SUM OF ALL RESPONSES TO PHQ9 QUESTIONS 1 & 2: 1
1. LITTLE INTEREST OR PLEASURE IN DOING THINGS: NOT AT ALL
SUM OF ALL RESPONSES TO PHQ QUESTIONS 1-9: 1
1. LITTLE INTEREST OR PLEASURE IN DOING THINGS: NOT AT ALL
2. FEELING DOWN, DEPRESSED OR HOPELESS: SEVERAL DAYS
2. FEELING DOWN, DEPRESSED OR HOPELESS: SEVERAL DAYS
SUM OF ALL RESPONSES TO PHQ QUESTIONS 1-9: 1
SUM OF ALL RESPONSES TO PHQ9 QUESTIONS 1 & 2: 1
SUM OF ALL RESPONSES TO PHQ QUESTIONS 1-9: 1
SUM OF ALL RESPONSES TO PHQ QUESTIONS 1-9: 1

## 2025-02-10 ENCOUNTER — OFFICE VISIT (OUTPATIENT)
Dept: FAMILY MEDICINE CLINIC | Facility: CLINIC | Age: 45
End: 2025-02-10
Payer: COMMERCIAL

## 2025-02-10 VITALS
OXYGEN SATURATION: 95 % | HEART RATE: 83 BPM | HEIGHT: 70 IN | DIASTOLIC BLOOD PRESSURE: 110 MMHG | SYSTOLIC BLOOD PRESSURE: 164 MMHG | WEIGHT: 303 LBS | BODY MASS INDEX: 43.38 KG/M2

## 2025-02-10 DIAGNOSIS — I10 PRIMARY HYPERTENSION: Primary | ICD-10-CM

## 2025-02-10 DIAGNOSIS — R73.9 ELEVATED BLOOD SUGAR: ICD-10-CM

## 2025-02-10 DIAGNOSIS — E66.01 MORBID OBESITY: ICD-10-CM

## 2025-02-10 DIAGNOSIS — G47.33 OSA (OBSTRUCTIVE SLEEP APNEA): ICD-10-CM

## 2025-02-10 DIAGNOSIS — Z87.39 HISTORY OF GOUT: ICD-10-CM

## 2025-02-10 DIAGNOSIS — R53.83 FATIGUE, UNSPECIFIED TYPE: ICD-10-CM

## 2025-02-10 DIAGNOSIS — E78.00 PURE HYPERCHOLESTEROLEMIA: ICD-10-CM

## 2025-02-10 LAB
ALBUMIN SERPL-MCNC: 3.7 G/DL (ref 3.5–5)
ALBUMIN/GLOB SERPL: 1 (ref 1–1.9)
ALP SERPL-CCNC: 88 U/L (ref 40–129)
ALT SERPL-CCNC: 36 U/L (ref 8–55)
ANION GAP SERPL CALC-SCNC: 10 MMOL/L (ref 7–16)
AST SERPL-CCNC: 54 U/L (ref 15–37)
BASOPHILS # BLD: 0.04 K/UL (ref 0–0.2)
BASOPHILS NFR BLD: 0.6 % (ref 0–2)
BILIRUB SERPL-MCNC: 0.4 MG/DL (ref 0–1.2)
BUN SERPL-MCNC: 22 MG/DL (ref 6–23)
CALCIUM SERPL-MCNC: 10.1 MG/DL (ref 8.8–10.2)
CHLORIDE SERPL-SCNC: 102 MMOL/L (ref 98–107)
CHOLEST SERPL-MCNC: 178 MG/DL (ref 0–200)
CO2 SERPL-SCNC: 29 MMOL/L (ref 20–29)
CREAT SERPL-MCNC: 1.64 MG/DL (ref 0.8–1.3)
DIFFERENTIAL METHOD BLD: ABNORMAL
EOSINOPHIL # BLD: 0.06 K/UL (ref 0–0.8)
EOSINOPHIL NFR BLD: 0.9 % (ref 0.5–7.8)
ERYTHROCYTE [DISTWIDTH] IN BLOOD BY AUTOMATED COUNT: 14.1 % (ref 11.9–14.6)
EST. AVERAGE GLUCOSE BLD GHB EST-MCNC: 121 MG/DL
GLOBULIN SER CALC-MCNC: 3.7 G/DL (ref 2.3–3.5)
GLUCOSE SERPL-MCNC: 108 MG/DL (ref 70–99)
HBA1C MFR BLD: 5.9 % (ref 0–5.6)
HCT VFR BLD AUTO: 42.8 % (ref 41.1–50.3)
HDLC SERPL-MCNC: 48 MG/DL (ref 40–60)
HDLC SERPL: 3.7 (ref 0–5)
HGB BLD-MCNC: 14.4 G/DL (ref 13.6–17.2)
IMM GRANULOCYTES # BLD AUTO: 0.01 K/UL (ref 0–0.5)
IMM GRANULOCYTES NFR BLD AUTO: 0.1 % (ref 0–5)
LDLC SERPL CALC-MCNC: 119 MG/DL (ref 0–100)
LYMPHOCYTES # BLD: 1.58 K/UL (ref 0.5–4.6)
LYMPHOCYTES NFR BLD: 22.8 % (ref 13–44)
MCH RBC QN AUTO: 30 PG (ref 26.1–32.9)
MCHC RBC AUTO-ENTMCNC: 33.6 G/DL (ref 31.4–35)
MCV RBC AUTO: 89.2 FL (ref 82–102)
MONOCYTES # BLD: 0.6 K/UL (ref 0.1–1.3)
MONOCYTES NFR BLD: 8.6 % (ref 4–12)
NEUTS SEG # BLD: 4.65 K/UL (ref 1.7–8.2)
NEUTS SEG NFR BLD: 67 % (ref 43–78)
NRBC # BLD: 0 K/UL (ref 0–0.2)
PLATELET # BLD AUTO: 364 K/UL (ref 150–450)
PMV BLD AUTO: 9 FL (ref 9.4–12.3)
POTASSIUM SERPL-SCNC: 4.3 MMOL/L (ref 3.5–5.1)
PROT SERPL-MCNC: 7.4 G/DL (ref 6.3–8.2)
RBC # BLD AUTO: 4.8 M/UL (ref 4.23–5.6)
SODIUM SERPL-SCNC: 142 MMOL/L (ref 136–145)
TRIGL SERPL-MCNC: 56 MG/DL (ref 0–150)
VLDLC SERPL CALC-MCNC: 11 MG/DL (ref 6–23)
WBC # BLD AUTO: 6.9 K/UL (ref 4.3–11.1)

## 2025-02-10 PROCEDURE — 3077F SYST BP >= 140 MM HG: CPT | Performed by: FAMILY MEDICINE

## 2025-02-10 PROCEDURE — 3080F DIAST BP >= 90 MM HG: CPT | Performed by: FAMILY MEDICINE

## 2025-02-10 PROCEDURE — 99214 OFFICE O/P EST MOD 30 MIN: CPT | Performed by: FAMILY MEDICINE

## 2025-02-10 RX ORDER — LOSARTAN POTASSIUM 50 MG/1
50 TABLET ORAL DAILY
Qty: 90 TABLET | Refills: 1 | Status: SHIPPED | OUTPATIENT
Start: 2025-02-10

## 2025-02-10 NOTE — PROGRESS NOTES
SUBJECTIVE:   Sergio Guerra is a 44 y.o. male who has a past medical history significant for hypertension, gout, high cholesterol, sleep apnea and morbid obesity.  Patient today reporting that he has been experience seeing some situational anxiety and stress.  He reports his wife has recently expressed a desire for a separation.  Patient reports that he has been undergoing counseling but she has respectfully declined.  He is not sure what to do in his obviously upset wanting to reconcile what ever problems there are.    In addition he reports low energy and fatigue.  He states that he would like his testosterone checked.  Occasionally he feels like his sex drive may be a little low.    In addition the patient reports he has been monitoring his blood pressure outside the office and it has been consistently elevated.  He is sure that stress is contributing but he has equally concerned about his risk for stroke.    Patient reports no active chest pain, shortness of breath, orthopnea or PND.  Current medicines are listed in the EMR and reviewed today.    HPI  See above    Past Medical History, Past Surgical History, Family history, Social History, and Medications were all reviewed with the patient today and updated as necessary.       Current Outpatient Medications   Medication Sig Dispense Refill    allopurinol (ZYLOPRIM) 300 MG tablet TAKE ONE TABLET BY MOUTH DAILY 90 tablet 1    amLODIPine (NORVASC) 5 MG tablet Take 1 tablet by mouth daily 90 tablet 1    sildenafil (VIAGRA) 100 MG tablet TAKE 1 TABLET BY MOUTH AS NEEDED FOR ERECTILE DYSFUNCTION 6 tablet 5    montelukast (SINGULAIR) 10 MG tablet Take 1 tablet by mouth daily 90 tablet 3     No current facility-administered medications for this visit.     Allergies   Allergen Reactions    Prednisone Other (See Comments)     hiccups  Patient states that he gets the hiccups.  hiccups       Patient Active Problem List   Diagnosis    Hiccups    Morbid obesity    Shift

## 2025-02-10 NOTE — PROGRESS NOTES
\"Have you been to the ER, urgent care clinic since your last visit?  Hospitalized since your last visit?\"    NO    “Have you seen or consulted any other health care providers outside our system since your last visit?”    NO      “Have you had a colorectal cancer screening such as a colonoscopy/FIT/Cologuard?    YES - Type: Colonoscopy - Where: [facility] to request most recent records if not in the chart     No colonoscopy on file  No cologuard on file  No FIT/FOBT on file   No flexible sigmoidoscopy on file

## 2025-02-13 LAB
TESTOST FREE SERPL-MCNC: 8.5 PG/ML (ref 6.8–21.5)
TESTOST SERPL-MCNC: 475 NG/DL (ref 264–916)

## 2025-03-21 ENCOUNTER — OFFICE VISIT (OUTPATIENT)
Dept: FAMILY MEDICINE CLINIC | Facility: CLINIC | Age: 45
End: 2025-03-21
Payer: COMMERCIAL

## 2025-03-21 VITALS
SYSTOLIC BLOOD PRESSURE: 132 MMHG | BODY MASS INDEX: 43.48 KG/M2 | WEIGHT: 303 LBS | DIASTOLIC BLOOD PRESSURE: 86 MMHG | HEART RATE: 74 BPM | OXYGEN SATURATION: 99 %

## 2025-03-21 DIAGNOSIS — N18.30 STAGE 3 CHRONIC KIDNEY DISEASE, UNSPECIFIED WHETHER STAGE 3A OR 3B CKD (HCC): ICD-10-CM

## 2025-03-21 DIAGNOSIS — F41.9 ANXIETY: ICD-10-CM

## 2025-03-21 DIAGNOSIS — R53.83 FATIGUE, UNSPECIFIED TYPE: ICD-10-CM

## 2025-03-21 DIAGNOSIS — G47.33 OSA (OBSTRUCTIVE SLEEP APNEA): ICD-10-CM

## 2025-03-21 DIAGNOSIS — Z87.39 HISTORY OF GOUT: ICD-10-CM

## 2025-03-21 DIAGNOSIS — I10 PRIMARY HYPERTENSION: ICD-10-CM

## 2025-03-21 DIAGNOSIS — R73.9 ELEVATED BLOOD SUGAR: Primary | ICD-10-CM

## 2025-03-21 DIAGNOSIS — E78.00 PURE HYPERCHOLESTEROLEMIA: ICD-10-CM

## 2025-03-21 DIAGNOSIS — F43.21 SITUATIONAL DEPRESSION: ICD-10-CM

## 2025-03-21 PROCEDURE — 3075F SYST BP GE 130 - 139MM HG: CPT | Performed by: FAMILY MEDICINE

## 2025-03-21 PROCEDURE — 99214 OFFICE O/P EST MOD 30 MIN: CPT | Performed by: FAMILY MEDICINE

## 2025-03-21 PROCEDURE — 3079F DIAST BP 80-89 MM HG: CPT | Performed by: FAMILY MEDICINE

## 2025-03-21 RX ORDER — SILDENAFIL 100 MG/1
100 TABLET, FILM COATED ORAL PRN
Qty: 6 TABLET | Refills: 5 | Status: CANCELLED | OUTPATIENT
Start: 2025-03-21

## 2025-03-21 RX ORDER — MONTELUKAST SODIUM 10 MG/1
10 TABLET ORAL DAILY
Qty: 90 TABLET | Refills: 3 | Status: SHIPPED | OUTPATIENT
Start: 2025-03-21 | End: 2026-03-16

## 2025-03-21 RX ORDER — ESCITALOPRAM OXALATE 10 MG/1
10 TABLET ORAL DAILY
Qty: 90 TABLET | Refills: 3 | Status: SHIPPED | OUTPATIENT
Start: 2025-03-21

## 2025-03-21 NOTE — PROGRESS NOTES
\"Have you been to the ER, urgent care clinic since your last visit?  Hospitalized since your last visit?\"    NO    “Have you seen or consulted any other health care providers outside our system since your last visit?”    NO    “Have you had a colorectal cancer screening such as a colonoscopy/FIT/Cologuard?    NO    No colonoscopy on file  No cologuard on file  No FIT/FOBT on file   No flexible sigmoidoscopy on file

## 2025-03-21 NOTE — PROGRESS NOTES
SUBJECTIVE:   Sergio Guerra is a 45 y.o. male who has a past medical history significant for hypertension, gout, high cholesterol, sleep apnea and morbid obesity.  At previous visit the patient reported increasing personal stress related to separation from his wife.  Unfortunately he reports that since I have last seen him his wife has filed for divorce.  Patient is trying to reconcile but states that she is uninterested.  He reports an increase in situational anxiety and depression because of this.  He was also complaining at last visit of worsening fatigue and low energy.  It was unclear as to whether or not there was a secondary cause or if it was more emotional.  We checked a CBC and metabolic panel.  These were unremarkable.  Recheck total and free testosterone which were also normal.  I added 50 mg of losartan to his Norvasc because of an increase in blood pressure.  He has tolerated this well.    Patient reports no active chest pain, shortness of breath, orthopnea or PND.    HPI  See above    Past Medical History, Past Surgical History, Family history, Social History, and Medications were all reviewed with the patient today and updated as necessary.       Current Outpatient Medications   Medication Sig Dispense Refill    montelukast (SINGULAIR) 10 MG tablet Take 1 tablet by mouth daily 90 tablet 3    escitalopram (LEXAPRO) 10 MG tablet Take 1 tablet by mouth daily 90 tablet 3    losartan (COZAAR) 50 MG tablet Take 1 tablet by mouth daily 90 tablet 1    allopurinol (ZYLOPRIM) 300 MG tablet TAKE ONE TABLET BY MOUTH DAILY 90 tablet 1    amLODIPine (NORVASC) 5 MG tablet Take 1 tablet by mouth daily 90 tablet 1    sildenafil (VIAGRA) 100 MG tablet TAKE 1 TABLET BY MOUTH AS NEEDED FOR ERECTILE DYSFUNCTION 6 tablet 5     No current facility-administered medications for this visit.     Allergies   Allergen Reactions    Prednisone Other (See Comments)     hiccups  Patient states that he gets the

## 2025-04-25 RX ORDER — LOSARTAN POTASSIUM 50 MG/1
50 TABLET ORAL DAILY
Qty: 90 TABLET | Refills: 1 | Status: SHIPPED | OUTPATIENT
Start: 2025-04-25

## 2025-05-02 ENCOUNTER — TELEMEDICINE (OUTPATIENT)
Dept: FAMILY MEDICINE CLINIC | Facility: CLINIC | Age: 45
End: 2025-05-02
Payer: COMMERCIAL

## 2025-05-02 DIAGNOSIS — Z87.39 HISTORY OF GOUT: ICD-10-CM

## 2025-05-02 DIAGNOSIS — R73.9 ELEVATED BLOOD SUGAR: ICD-10-CM

## 2025-05-02 DIAGNOSIS — I10 PRIMARY HYPERTENSION: ICD-10-CM

## 2025-05-02 DIAGNOSIS — N18.30 STAGE 3 CHRONIC KIDNEY DISEASE, UNSPECIFIED WHETHER STAGE 3A OR 3B CKD (HCC): ICD-10-CM

## 2025-05-02 DIAGNOSIS — F43.21 SITUATIONAL DEPRESSION: ICD-10-CM

## 2025-05-02 DIAGNOSIS — F41.9 ANXIETY: Primary | ICD-10-CM

## 2025-05-02 DIAGNOSIS — E78.00 PURE HYPERCHOLESTEROLEMIA: ICD-10-CM

## 2025-05-02 PROCEDURE — 99214 OFFICE O/P EST MOD 30 MIN: CPT | Performed by: FAMILY MEDICINE

## 2025-05-02 RX ORDER — AMLODIPINE BESYLATE 5 MG/1
5 TABLET ORAL DAILY
Qty: 90 TABLET | Refills: 1 | Status: SHIPPED | OUTPATIENT
Start: 2025-05-02

## 2025-05-02 RX ORDER — ESCITALOPRAM OXALATE 20 MG/1
20 TABLET ORAL DAILY
Qty: 90 TABLET | Refills: 3 | Status: SHIPPED | OUTPATIENT
Start: 2025-05-02

## 2025-05-02 RX ORDER — ALLOPURINOL 300 MG/1
TABLET ORAL
Qty: 90 TABLET | Refills: 1 | Status: SHIPPED | OUTPATIENT
Start: 2025-05-02

## 2025-05-02 NOTE — PROGRESS NOTES
Have you been to the ER, urgent care clinic since your last visit?  Hospitalized since your last visit?   NO    Have you seen or consulted any other health care providers outside our system since your last visit?   NO    “Have you had a colorectal cancer screening such as a colonoscopy/FIT/Cologuard?    02/19/2019    No colonoscopy on file  No cologuard on file  No FIT/FOBT on file   No flexible sigmoidoscopy on file

## 2025-05-02 NOTE — PROGRESS NOTES
SUBJECTIVE:   Sergio Guerra is a 45 y.o. male  who has a past medical history significant for hypertension, gout, high cholesterol, sleep apnea and morbid obesity.  Patient presents today for virtual visit via video [employer] messaging.  Previous visit the patient had reported some situational depression and anxiety related to separation and divorce from his wife.  Please refer to prior notes for details.  We started him on Lexapro 10 mg a day.  He is tolerated this well and feels like it is helping him some.  However there is still some noted room for improvement.  No reports of adverse effects from the medication.  He denies chest pain, shortness of breath, orthopnea or PND.    Patient's vital signs were not performed as encounter was performed virtually.  Location of virtual visit was patient's home.      HPI  See above    Past Medical History, Past Surgical History, Family history, Social History, and Medications were all reviewed with the patient today and updated as necessary.       Current Outpatient Medications   Medication Sig Dispense Refill    losartan (COZAAR) 50 MG tablet Take 1 tablet by mouth daily 90 tablet 1    montelukast (SINGULAIR) 10 MG tablet Take 1 tablet by mouth daily 90 tablet 3    escitalopram (LEXAPRO) 10 MG tablet Take 1 tablet by mouth daily 90 tablet 3    allopurinol (ZYLOPRIM) 300 MG tablet TAKE ONE TABLET BY MOUTH DAILY 90 tablet 1    amLODIPine (NORVASC) 5 MG tablet Take 1 tablet by mouth daily 90 tablet 1    sildenafil (VIAGRA) 100 MG tablet TAKE 1 TABLET BY MOUTH AS NEEDED FOR ERECTILE DYSFUNCTION 6 tablet 5     No current facility-administered medications for this visit.     Allergies   Allergen Reactions    Prednisone Other (See Comments)     hiccups  Patient states that he gets the hiccups.  hiccups       Patient Active Problem List   Diagnosis    Hiccups    Morbid obesity (HCC)    Shift work sleep disorder    Folliculitis    Weight gain    Gout    ELEAZAR on CPAP    Essential

## 2025-06-10 ENCOUNTER — TELEMEDICINE (OUTPATIENT)
Dept: FAMILY MEDICINE CLINIC | Facility: CLINIC | Age: 45
End: 2025-06-10
Payer: COMMERCIAL

## 2025-06-10 DIAGNOSIS — R73.9 ELEVATED BLOOD SUGAR: ICD-10-CM

## 2025-06-10 DIAGNOSIS — I10 PRIMARY HYPERTENSION: ICD-10-CM

## 2025-06-10 DIAGNOSIS — E78.00 PURE HYPERCHOLESTEROLEMIA: ICD-10-CM

## 2025-06-10 DIAGNOSIS — F41.9 ANXIETY: Primary | ICD-10-CM

## 2025-06-10 DIAGNOSIS — Z87.39 HISTORY OF GOUT: ICD-10-CM

## 2025-06-10 DIAGNOSIS — N18.30 STAGE 3 CHRONIC KIDNEY DISEASE, UNSPECIFIED WHETHER STAGE 3A OR 3B CKD (HCC): ICD-10-CM

## 2025-06-10 PROCEDURE — 99213 OFFICE O/P EST LOW 20 MIN: CPT | Performed by: FAMILY MEDICINE

## 2025-06-10 ASSESSMENT — PATIENT HEALTH QUESTIONNAIRE - PHQ9
SUM OF ALL RESPONSES TO PHQ QUESTIONS 1-9: 0
SUM OF ALL RESPONSES TO PHQ QUESTIONS 1-9: 0
1. LITTLE INTEREST OR PLEASURE IN DOING THINGS: NOT AT ALL
2. FEELING DOWN, DEPRESSED OR HOPELESS: NOT AT ALL
SUM OF ALL RESPONSES TO PHQ QUESTIONS 1-9: 0
SUM OF ALL RESPONSES TO PHQ QUESTIONS 1-9: 0

## 2025-06-10 NOTE — PROGRESS NOTES
focus.      Consent:    Sergio Guerra, was evaluated through a synchronous (real-time) audio-video encounter. The patient (or guardian if applicable) is aware that this is a billable service, which includes applicable co-pays. This Virtual Visit was conducted with patient's (and/or legal guardian's) consent. Patient identification was verified, and a caregiver was present when appropriate.   The patient was located at Home: [address] [place] [id number]  Provider was located at Facility (Appt Dept): [address] [place] [address]  Confirm you are appropriately licensed, registered, or certified to deliver care in the state where the patient is located as indicated above. If you are not or unsure, please re-schedule the visit: Yes, I confirm.          On this date 6/10/2025 I have spent 21 minutes reviewing previous notes, test results and face to face with the patient discussing the diagnosis and importance of compliance with the treatment plan as well as documenting on the day of the visit. Greater than 50% of this visit was spent counseling the patient about test results, prognosis, importance of compliance, education about disease process, benefits of medications, instructions for management of acute symptoms, and follow up plans.    --ESME FLOR MD on 6/10/2025 at 5:28 PM    An electronic signature was used to authenticate this note.

## 2025-07-14 RX ORDER — MONTELUKAST SODIUM 10 MG/1
10 TABLET ORAL DAILY
Qty: 90 TABLET | Refills: 3 | Status: SHIPPED | OUTPATIENT
Start: 2025-07-14

## 2025-08-06 ENCOUNTER — PATIENT MESSAGE (OUTPATIENT)
Dept: FAMILY MEDICINE CLINIC | Facility: CLINIC | Age: 45
End: 2025-08-06